# Patient Record
Sex: FEMALE | Race: WHITE | Employment: OTHER | URBAN - METROPOLITAN AREA
[De-identification: names, ages, dates, MRNs, and addresses within clinical notes are randomized per-mention and may not be internally consistent; named-entity substitution may affect disease eponyms.]

---

## 2018-10-26 ENCOUNTER — TRANSFERRED RECORDS (OUTPATIENT)
Dept: HEALTH INFORMATION MANAGEMENT | Facility: CLINIC | Age: 83
End: 2018-10-26

## 2018-11-29 ENCOUNTER — TELEPHONE (OUTPATIENT)
Dept: OPHTHALMOLOGY | Facility: CLINIC | Age: 83
End: 2018-11-29

## 2018-11-29 ENCOUNTER — TRANSFERRED RECORDS (OUTPATIENT)
Dept: HEALTH INFORMATION MANAGEMENT | Facility: CLINIC | Age: 83
End: 2018-11-29

## 2018-11-29 NOTE — TELEPHONE ENCOUNTER
Note to oculo-plastics team to review plan/answer pt questions about appt-- evaluation/possible enucleation referral  Satya Macario RN 3:50 PM 11/29/18

## 2018-11-29 NOTE — TELEPHONE ENCOUNTER
[facility] Call Center    Phone Message    May a detailed message be left on voicemail: yes    Reason for Call: Other: per pt son don- would like a call back to discuss upcoming appt with dr flores. such as visit length- and recovery time thanks     Action Taken: Message routed to:  [facility] ([facility] eye clinic

## 2018-12-03 ENCOUNTER — OFFICE VISIT (OUTPATIENT)
Dept: OPHTHALMOLOGY | Facility: CLINIC | Age: 83
End: 2018-12-03
Payer: COMMERCIAL

## 2018-12-03 ENCOUNTER — DOCUMENTATION ONLY (OUTPATIENT)
Dept: OPHTHALMOLOGY | Facility: CLINIC | Age: 83
End: 2018-12-03

## 2018-12-03 VITALS — HEIGHT: 59 IN | WEIGHT: 137 LBS | BODY MASS INDEX: 27.62 KG/M2

## 2018-12-03 DIAGNOSIS — H57.10 BLIND PAINFUL EYE: Primary | ICD-10-CM

## 2018-12-03 DIAGNOSIS — Z94.7 CORNEA REPLACED BY TRANSPLANT: ICD-10-CM

## 2018-12-03 DIAGNOSIS — H54.40 BLIND PAINFUL EYE: Primary | ICD-10-CM

## 2018-12-03 RX ORDER — BRIMONIDINE TARTRATE 2 MG/ML
SOLUTION/ DROPS OPHTHALMIC
Refills: 6 | COMMUNITY
Start: 2018-09-19

## 2018-12-03 RX ORDER — BIMATOPROST 0.1 MG/ML
SOLUTION/ DROPS OPHTHALMIC
Refills: 6 | COMMUNITY
Start: 2018-10-23

## 2018-12-03 RX ORDER — DORZOLAMIDE HCL 20 MG/ML
SOLUTION/ DROPS OPHTHALMIC
Refills: 6 | COMMUNITY
Start: 2018-10-23

## 2018-12-03 RX ORDER — LIGHT MINERAL OIL, WHITE PETROLATUM 150; 850 MG/G; MG/G
OINTMENT OPHTHALMIC
Refills: 0 | COMMUNITY
Start: 2018-10-26 | End: 2018-12-20

## 2018-12-03 RX ORDER — PROPARACAINE HYDROCHLORIDE 5 MG/ML
SOLUTION/ DROPS OPHTHALMIC
Refills: 0 | COMMUNITY
Start: 2018-11-30 | End: 2018-12-20

## 2018-12-03 RX ORDER — LOTEPREDNOL ETABONATE 5 MG/G
GEL OPHTHALMIC
Refills: 6 | COMMUNITY
Start: 2018-10-23

## 2018-12-03 RX ORDER — OXYBUTYNIN CHLORIDE 5 MG/1
5 TABLET, EXTENDED RELEASE ORAL DAILY
Refills: 3 | COMMUNITY
Start: 2018-12-02

## 2018-12-03 RX ORDER — TIMOLOL MALEATE 5 MG/ML
SOLUTION/ DROPS OPHTHALMIC
Refills: 6 | COMMUNITY
Start: 2018-10-23

## 2018-12-03 RX ORDER — ONDANSETRON 4 MG/1
TABLET, ORALLY DISINTEGRATING ORAL
Refills: 0 | COMMUNITY
Start: 2018-11-26 | End: 2018-12-14

## 2018-12-03 RX ORDER — LISINOPRIL 20 MG/1
TABLET ORAL
Refills: 3 | COMMUNITY
Start: 2018-12-02 | End: 2018-12-14

## 2018-12-03 RX ORDER — LEVETIRACETAM 500 MG/1
500 TABLET ORAL DAILY
Refills: 3 | COMMUNITY
Start: 2018-12-02

## 2018-12-03 RX ORDER — TRAMADOL HYDROCHLORIDE 50 MG/1
TABLET ORAL
Refills: 0 | COMMUNITY
Start: 2018-11-26

## 2018-12-03 ASSESSMENT — VISUAL ACUITY
OD_CC: 20/70
CORRECTION_TYPE: GLASSES
METHOD: SNELLEN - LINEAR
OD_CC+: -1
OS_CC: NLP

## 2018-12-03 ASSESSMENT — SLIT LAMP EXAM - LIDS
COMMENTS: NORMAL
COMMENTS: NORMAL

## 2018-12-03 ASSESSMENT — EXTERNAL EXAM - RIGHT EYE: OD_EXAM: NORMAL

## 2018-12-03 ASSESSMENT — TONOMETRY
OD_IOP_MMHG: 10
OS_IOP_MMHG: 6
IOP_METHOD: ICARE

## 2018-12-03 ASSESSMENT — CONF VISUAL FIELD
OS_SUPERIOR_NASAL_RESTRICTION: 1
OD_NORMAL: 1
OS_INFERIOR_TEMPORAL_RESTRICTION: 1
OS_INFERIOR_NASAL_RESTRICTION: 1
OS_SUPERIOR_TEMPORAL_RESTRICTION: 1

## 2018-12-03 ASSESSMENT — EXTERNAL EXAM - LEFT EYE: OS_EXAM: NORMAL

## 2018-12-03 NOTE — LETTER
12/3/2018         RE:  :  MRN: Amna Herrera  [id number]  [id number]     Dear Dr. Homero Childers,    Thank you for asking me to see your patient, Amna Herrera, for an oculoplastic   consultation.  My assessment and plan are below.  For further details, please see my attached clinic note.          Chief Complaints and History of Present Illnesses   Patient presents with     Consult For     Left eye pain     Patient has had multiple pentrating keratoplasty (PKP) both eyes now no light perception left eye after injury in . Vision has been poor in the left eye since that injury. Patient started having intense pain in the left eye since 2 weeks ago. She was admitted to the hospital for pain control and patient has been using artificial tear ointment which has helped with the eye pain. She was admitted again for pain control when she had intense eye pain last week. Patient is comfortable currently.    Assessment & Plan     Amna Herrera is a 89 year old female with the following diagnoses:   1. Blind painful eye    2. 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      Sincerely,    Cristobal Duque MD  Department of Ophthalmology and Visual Neurosciences  [facility]    CC: Rey Cherry MD  [address] [address]  VIA Facsimile: [phone]     HOMERO CHILDERS  [facility]  [address] [address]  VIA Facsimile: [phone]

## 2018-12-03 NOTE — NURSING NOTE
Chief Complaints and History of Present Illnesses   Patient presents with     Consult For     Left eye pain     HPI    Affected eye(s):  Left   Symptoms:     No blurred vision      Frequency:  Constant       Do you have eye pain now?:  Yes   Location:  OS      Comments:  Pt has had episodes of left eye pain resulting in visits to the ER. Pt states comfort improves with ointment- puralube. Pt states using eye drops for the right eye.     Jase GARCIA 12:32 PM December 3, 2018

## 2018-12-03 NOTE — PROGRESS NOTES
Chief Complaints and History of Present Illnesses   Patient presents with     Consult For     Left eye pain     Patient has had multiple pentrating keratoplasty (PKP) both eyes now no light perception left eye after injury in 2013. Vision has been poor in the left eye since that injury. Patient started having intense pain in the left eye since 2 weeks ago. She was admitted to the hospital for pain control and patient has been using artificial tear ointment which has helped with the eye pain. She was admitted again for pain control when she had intense eye pain last week. Patient is comfortable currently.    Assessment & Plan     Amna Herrera is a 89 year old female with the following diagnoses:   1. 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  I personally obtained the chief complaint(s) and history of present illness.  I confirmed and edited as necessary the review of systems, past medical/surgical history, family history, social history, and examination findings as documented by others; and I examined the patient myself.  I personally reviewed the relevant tests, images, and reports as documented above.  I formulated and edited as necessary the assessment and plan and discussed the findings and management plan with the patient and family. I personally reviewed the ophthalmic test(s) associated with this encounter, agree with the interpretation(s) as documented by the resident/fellow, and have edited the corresponding report(s) as necessary.   -Cristobal Duque MD    Today with Amna Herrera  and her family members, I reviewed the  indications, risks, benefits, and alternatives of the proposed surgical procedure including, but not limited to, failure obtain the desired result  and need for additional surgery, bleeding, infection, loss of vision, loss of the eye, and the remote possibility of permanent damage to any organ system or death with the use of anesthesia.  I provided multiple opportunities for the questions, answered all questions to the best of my ability, and confirmed that my answers and my discussion were understood.   - Cristobal Duque MD 1:26 PM 12/3/2018

## 2018-12-03 NOTE — PROGRESS NOTES
Met with patient to schedule surgery with Dr. Cristobal Duque.    Surgery was scheduled on 12/19 at [facility].    Patient will have H&P at [facility]  Post-Op care appointment was scheduled on 12/28  Patient is aware a / is needed day of surgery.   Patient received surgery packet has my direct contact information for any further questions.

## 2018-12-03 NOTE — PATIENT INSTRUCTIONS
Enucleation and Evisceration     What are enucleation and evisceration?  Enucleation is the surgical removal of the entire eye. Evisceration is the surgical removal of the contents of the eye, leaving the white part of the eye and the eye muscles intact.     Why is enucleation or evisceration necessary?  Removal of an eye may be required following a severe injury, to control pain in a blind eye, to treat some intra-ocular tumors, to alleviate a severe infection inside the eye, or for cosmetic improvement of a disfigured eye.     Why chose one procedure over another?  Enucleation is the procedure of choice if the eye is being removed to treat an intraocular tumor, or to try to reduce the risk of developing a severe autoimmune condition called sympathetic ophthalmia following trauma. In most other situations, either enucleation or evisceration can each achieve the desired objective. Your surgeon will help you to determine which surgery is most appropriate for your condition.     How is the surgery performed?  Both surgeries are usually performed in the operating room under general anesthesia, although they can be completed safely using local anesthesia with sedation. After enucleation or evisceration, most of the lost volume is replaced by an implant placed in the eye socket. The implant is a usually a sphere made of silicone rubber, polyethylene, hydroxyapatite, or alumina, and is covered by the patient's own tissue. In most cases, the eye muscles are attached to the implant following enucleation, in order to preserve eye movement. Several weeks after surgery, an artificial eye, or prosthesis, is made by an . The front surface of the artificial eye is custom painted to match the patient's other eye. The back surface is custom molded to fit exactly in the eye socket for maximum comfort and movement. The prosthesis is easily removable, and may be removed as needed for cleaning. Most patients sleep with the  prosthesis in place. A prosthesis lasts up to 5-10 years in many patients     What is the post-operative care?  Some patients spend the night at the hospital, while others go home the same day as surgery. You may be asked to take medications after surgery such as antibiotics, steroids, or pain-relievers. Patients may wear a patch after surgery for several days to several weeks, until they receive their prosthesis. Continued follow-up is important as the tissues in the socket may atrophy (shrink) with time. This loss of volume may lead to eyelid laxity or socket changes that may affect the fit of the prosthesis. Careful monitoring of the socket and prosthesis by the surgeon and the  will help keep the socket healthy, and will allow for early detection of any changes that may require further treatment.     What are the risks and complications?  Short-term risks for this surgery, as with any surgery, include bleeding and infection. Longer-range complications include discharge and socket irritation or exposure of the implant. As with any medical procedure, there may be other inherent risks that should be discussed with your surgeon.     Who performs the surgery?  Patients are most commonly treated by ophthalmic plastic and reconstructive surgeons who specialize in diseases and problems of the eyelids, tear drain, and orbit (the area around the eye). Dr. Duque has completed an American Society of Ophthalmic Plastic and Reconstructive Surgery (ASOPRS) fellowship. This indicates that he is not only a board certified ophthalmologist, but also has had extensive training in ophthalmic plastic surgery. When you are ready, you will be in experienced hands. Your surgery will be in an outpatient facility or at a hospital depending on your surgical needs.

## 2018-12-03 NOTE — MR AVS SNAPSHOT
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  Enucleation is the surgical removal of the entire eye. Evisceration is the surgical removal of the contents of the eye, leaving the white part of the eye and the eye muscles intact.     Why is enucleation or evisceration necessary?  Removal of an eye may be required following a severe injury, to control pain in a blind eye, to treat some intra-ocular tumors, to alleviate a severe infection inside the eye, or for cosmetic improvement of a disfigured eye.     Why chose one procedure over another?  Enucleation is the procedure of choice if the eye is being removed to treat an intraocular tumor, or to try to reduce the risk of developing a severe autoimmune condition called sympathetic ophthalmia following trauma. In most other situations, either enucleation or evisceration can each achieve the desired objective. Your surgeon will help you to determine which surgery is most appropriate for your condition.     How is the surgery performed?  Both surgeries are usually performed in the operating room under general anesthesia, although they can be completed safely using local anesthesia with sedation. After enucleation or evisceration, most of the lost volume is replaced by an implant placed in the eye socket. The implant is a usually a sphere made of silicone rubber, polyethylene, hydroxyapatite, or alumina, and is covered by the patient's own tissue. In most cases, the eye muscles are attached to the implant following enucleation, in order to preserve eye movement.  Several weeks after surgery, an artificial eye, or prosthesis, is made by an . The front surface of the artificial eye is custom painted to match the patient's other eye. The back surface is custom molded to fit exactly in the eye socket for maximum comfort and movement. The prosthesis is easily removable, and may be removed as needed for cleaning. Most patients sleep with the prosthesis in place. A prosthesis lasts up to 5-10 years in many patients     What is the post-operative care?  Some patients spend the night at the hospital, while others go home the same day as surgery. You may be asked to take medications after surgery such as antibiotics, steroids, or pain-relievers. Patients may wear a patch after surgery for several days to several weeks, until they receive their prosthesis. Continued follow-up is important as the tissues in the socket may atrophy (shrink) with time. This loss of volume may lead to eyelid laxity or socket changes that may affect the fit of the prosthesis. Careful monitoring of the socket and prosthesis by the surgeon and the  will help keep the socket healthy, and will allow for early detection of any changes that may require further treatment.     What are the risks and complications?  Short-term risks for this surgery, as with any surgery, include bleeding and infection. Longer-range complications include discharge and socket irritation or exposure of the implant. As with any medical procedure, there may be other inherent risks that should be discussed with your surgeon.     Who performs the surgery?  Patients are most commonly treated by ophthalmic plastic and reconstructive surgeons who specialize in diseases and problems of the eyelids, tear drain, and orbit (the area around the eye). Dr. Duque has completed an American Society of Ophthalmic Plastic and Reconstructive Surgery (ASOPRS) fellowship. This indicates that he is not only a board certified  "ophthalmologist, but also has had extensive training in ophthalmic plastic surgery. When you are ready, you will be in experienced hands. Your surgery will be in an outpatient facility or at a hospital depending on your surgical needs.              Follow-ups after your visit        Your next 10 appointments already scheduled     Dec 28, 2018 12:00 PM CST   (Arrive by 11:45 AM)   Post-Op with David Lamas MD   [facility] Ophthalmology ([facility])    [address] [place] [address] [phone]              Who to contact     Please call your clinic at [phone] to:    Ask questions about your health    Make or cancel appointments    Discuss your medicines    Learn about your test results    Speak to your doctor            Additional Information About Your Visit        MyChart Information     [facility] is an electronic gateway that provides easy, online access to your medical records. With [facility], you can request a clinic appointment, read your test results, renew a prescription or communicate with your care team.     To sign up for [employer] visit the website at [email or web address]   You will be asked to enter the access code listed below, as well as some personal information. Please follow the directions to create your username and password.     Your access code is: [email or web address]  Expires: 2019  6:30 AM     Your access code will  in 90 days. If you need help or a new code, please contact your [facility] or call [phone] for assistance.        Care EveryWhere ID     This is your Care EveryWhere ID. This could be used by other organizations to access your [place] medical records  [id number]        Your Vitals Were     Height BMI (Body Mass Index)                1.499 m (4' 11\") 27.67 kg/m2           Blood Pressure from Last 3 Encounters:   11 159/81    Weight from Last 3 Encounters:   18 " 62.1 kg (137 lb)   08/31/11 73 kg (160 lb 15 oz)              We Performed the Following     Liudmila-Operative Worksheet (Plastics)       Information about OPIOIDS     PRESCRIPTION OPIOIDS: WHAT YOU NEED TO KNOW   We gave you an opioid (narcotic) pain medicine. It is important to manage your pain, but opioids are not always the best choice. You should first try all the other options your care team gave you. Take this medicine for as short a time (and as few doses) as possible.    Some activities can increase your pain, such as bandage changes or therapy sessions. It may help to take your pain medicine 30 to 60 minutes before these activities. Reduce your stress by getting enough sleep, working on hobbies you enjoy and practicing relaxation or meditation. Talk to your care team about ways to manage your pain beyond prescription opioids.    These medicines have risks:    DO NOT drive when on new or higher doses of pain medicine. These medicines can affect your alertness and reaction times, and you could be arrested for driving under the influence (DUI). If you need to use opioids long-term, talk to your care team about driving.    DO NOT operate heavy machinery    DO NOT do any other dangerous activities while taking these medicines.    DO NOT drink any alcohol while taking these medicines.     If the opioid prescribed includes acetaminophen, DO NOT take with any other medicines that contain acetaminophen. Read all labels carefully. Look for the word  acetaminophen  or  Tylenol.  Ask your pharmacist if you have questions or are unsure.    You can get addicted to pain medicines, especially if you have a history of addiction (chemical, alcohol or substance dependence). Talk to your care team about ways to reduce this risk.    All opioids tend to cause constipation. Drink plenty of water and eat foods that have a lot of fiber, such as fruits, vegetables, prune juice, apple juice and high-fiber cereal. Take a laxative  (Miralax, milk of magnesia, Colace, Senna) if you don t move your bowels at least every other day. Other side effects include upset stomach, sleepiness, dizziness, throwing up, tolerance (needing more of the medicine to have the same effect), physical dependence and slowed breathing.    Store your pills in a secure place, locked if possible. We will not replace any lost or stolen medicine. If you don t finish your medicine, please throw away (dispose) as directed by your pharmacist. The [place] Pollution Control Agency has more information about safe disposal: [email or web address].[email or web address]-medications         Primary Care Provider Office Phone # Fax #    Rey Cherry [phone]       [facility] [address] [facility] [address]        Equal Access to Services     [facility] Center[facility]HEL[facility]E : Murray klein Somalvin, waaxda luqadaha, qaybta kaalmada mary kay, anurag lobo . So [facility] [phone].    ATENCIÓN: Si habla español, tiene a gutierrez disposición servicios gratuitos de asistencia lingüística. Kae al [phone].    We comply with applicable federal civil rights laws and [place] laws. We do not discriminate on the basis of race, color, national origin, age, disability, sex, sexual orientation, or gender identity.            Thank you!     Thank you for choosing [facility]  for your care. Our goal is always to provide you with excellent care. Hearing back from our patients is one way we can continue to improve our services. Please take a few minutes to complete the written survey that you may receive in the mail after your visit with us. Thank you!             Your Updated Medication List - Protect others around you: Learn how to safely use, store and throw away your medicines at www.disposemymeds.org.          This list is accurate as of 12/3/18  1:44 PM.  Always use your most recent med list.                    Brand Name Dispense Instructions for use Diagnosis    brimonidine 0.2 % ophthalmic solution    ALPHAGAN     INSTILL ONE DROP IN RIGHT EYE THREE TIMES DAILY        cholecalciferol 400 unit (10 mcg) Tabs tablet    VITAMIN D3     Take 400 Units by mouth daily.        dexamethasone 0.1 % ophthalmic suspension    DECADRON     Place 1 drop into both eyes 3 times daily.        dorzolamide 2 % ophthalmic solution    TRUSOPT     INSTILL 1 DROP INTO RIGHT EYE 2 TIMES DAILY.        levETIRAcetam 500 MG tablet    KEPPRA     Take 500 mg by mouth daily        * lisinopril 20 MG tablet    PRINIVIL/ZESTRIL     TAKE 2 TABLETS BY MOUTH EVERY DAY        * lisinopril 40 MG tablet    PRINIVIL/ZESTRIL     Take 40 mg by mouth daily.        * LOTEMAX 0.5 % Gel   Generic drug:  Loteprednol Etabonate      INSTILL ONE DROP IN RIGHT EYE ONCE DAILY.        * loteprednol 0.2 % Susp ophthalmic susp    LOTEMAX/ALREX     1 drop daily. Once daily in left eye        LUMIGAN 0.01 % Soln   Generic drug:  bimatoprost      INSTILL 1 DROP INTO RIGHT EYE ONCE DAILY IN THE EVENING.        MILK OF MAGNESIA 400 MG/5ML suspension   Generic drug:  magnesium hydroxide      Take  by mouth daily as needed.        multivitamin per tablet      Take 1 tablet by mouth daily.        ondansetron 4 MG ODT tab    ZOFRAN-ODT     TAKE 1 REMOVE 1 TABLET FROM PACKAGING WITH DRY HANDS PLACE ON TONGUE TO DISSOLVE FOUR TIMES DAILY AS NEEDED        oxybutynin ER 5 MG 24 hr tablet    DITROPAN-XL     Take 5 mg by mouth daily        petrolatum ophthalmic ointment      APPLY 1 4 INCH STRIP TO LEFT LOWER EYE LID TWICE A DAY        proparacaine 0.5 % ophthalmic solution    ALCAINE     INSTILL ONE DROP IN LEFT EYE AS NEEDED FOR SEVERE EYE PAIN NOT TO EXCEED 3 DOSES PER DAY        SHINGRIX injection   Generic drug:  zoster vaccine recombinant adjuvanted      INJECT 0.5ML INTRAMUSCULLAR LY.        timolol maleate 0.5 % ophthalmic solution    TIMOPTIC     INSTILL 1 DROP INTO RIGHT  EYE 2 TIMES DAILY.        traMADol 50 MG tablet    ULTRAM     TAKE ONE TABLET BY MOUTH EVERY 8 HOURS AS NEEDED FOR PAIN        XALATAN 0.005 % ophthalmic solution   Generic drug:  latanoprost      Place 1 drop Into the left eye At Bedtime.        * Notice:  This list has 4 medication(s) that are the same as other medications prescribed for you. Read the directions carefully, and ask your doctor or other care provider to review them with you.

## 2018-12-18 ENCOUNTER — ANESTHESIA EVENT (OUTPATIENT)
Dept: SURGERY | Facility: AMBULATORY SURGERY CENTER | Age: 83
End: 2018-12-18

## 2018-12-19 ENCOUNTER — HOSPITAL ENCOUNTER (OUTPATIENT)
Facility: AMBULATORY SURGERY CENTER | Age: 83
End: 2018-12-19
Attending: OPHTHALMOLOGY
Payer: COMMERCIAL

## 2018-12-19 ENCOUNTER — TELEPHONE (OUTPATIENT)
Dept: OPHTHALMOLOGY | Facility: CLINIC | Age: 83
End: 2018-12-19

## 2018-12-19 ENCOUNTER — ANESTHESIA (OUTPATIENT)
Dept: SURGERY | Facility: AMBULATORY SURGERY CENTER | Age: 83
End: 2018-12-19

## 2018-12-19 VITALS
WEIGHT: 137 LBS | DIASTOLIC BLOOD PRESSURE: 87 MMHG | RESPIRATION RATE: 16 BRPM | BODY MASS INDEX: 27.62 KG/M2 | OXYGEN SATURATION: 98 % | HEART RATE: 77 BPM | SYSTOLIC BLOOD PRESSURE: 146 MMHG | TEMPERATURE: 97.2 F | HEIGHT: 59 IN

## 2018-12-19 DIAGNOSIS — R11.0 NAUSEA: Primary | ICD-10-CM

## 2018-12-19 DIAGNOSIS — Z98.890 POSTOPERATIVE EYE STATE: Primary | ICD-10-CM

## 2018-12-19 DEVICE — EYE IMP SPHERE SILICONE 18MM S6.1018U: Type: IMPLANTABLE DEVICE | Site: EYE | Status: FUNCTIONAL

## 2018-12-19 RX ORDER — PROPOFOL 10 MG/ML
INJECTION, EMULSION INTRAVENOUS PRN
Status: DISCONTINUED | OUTPATIENT
Start: 2018-12-19 | End: 2018-12-19

## 2018-12-19 RX ORDER — LIDOCAINE HYDROCHLORIDE AND EPINEPHRINE 10; 10 MG/ML; UG/ML
INJECTION, SOLUTION INFILTRATION; PERINEURAL PRN
Status: DISCONTINUED | OUTPATIENT
Start: 2018-12-19 | End: 2018-12-19 | Stop reason: HOSPADM

## 2018-12-19 RX ORDER — DEXAMETHASONE SODIUM PHOSPHATE 4 MG/ML
INJECTION, SOLUTION INTRA-ARTICULAR; INTRALESIONAL; INTRAMUSCULAR; INTRAVENOUS; SOFT TISSUE PRN
Status: DISCONTINUED | OUTPATIENT
Start: 2018-12-19 | End: 2018-12-19

## 2018-12-19 RX ORDER — ASPIRIN 81 MG/1
81 TABLET ORAL DAILY
COMMUNITY

## 2018-12-19 RX ORDER — HYDROCODONE BITARTRATE AND ACETAMINOPHEN 5; 325 MG/1; MG/1
1-2 TABLET ORAL EVERY 4 HOURS PRN
Qty: 15 TABLET | Refills: 0 | Status: SHIPPED | OUTPATIENT
Start: 2018-12-19 | End: 2019-02-11

## 2018-12-19 RX ORDER — OXYCODONE HYDROCHLORIDE 5 MG/1
5 TABLET ORAL EVERY 4 HOURS PRN
Status: DISCONTINUED | OUTPATIENT
Start: 2018-12-19 | End: 2018-12-20 | Stop reason: HOSPADM

## 2018-12-19 RX ORDER — CEPHALEXIN 500 MG/1
500 CAPSULE ORAL 2 TIMES DAILY
Qty: 20 CAPSULE | Refills: 0 | Status: SHIPPED | OUTPATIENT
Start: 2018-12-19 | End: 2019-02-11

## 2018-12-19 RX ORDER — NALOXONE HYDROCHLORIDE 0.4 MG/ML
.1-.4 INJECTION, SOLUTION INTRAMUSCULAR; INTRAVENOUS; SUBCUTANEOUS
Status: DISCONTINUED | OUTPATIENT
Start: 2018-12-19 | End: 2018-12-20 | Stop reason: HOSPADM

## 2018-12-19 RX ORDER — SODIUM CHLORIDE, SODIUM LACTATE, POTASSIUM CHLORIDE, CALCIUM CHLORIDE 600; 310; 30; 20 MG/100ML; MG/100ML; MG/100ML; MG/100ML
INJECTION, SOLUTION INTRAVENOUS CONTINUOUS
Status: DISCONTINUED | OUTPATIENT
Start: 2018-12-19 | End: 2018-12-19 | Stop reason: HOSPADM

## 2018-12-19 RX ORDER — ONDANSETRON 2 MG/ML
4 INJECTION INTRAMUSCULAR; INTRAVENOUS EVERY 30 MIN PRN
Status: DISCONTINUED | OUTPATIENT
Start: 2018-12-19 | End: 2018-12-20 | Stop reason: HOSPADM

## 2018-12-19 RX ORDER — FENTANYL CITRATE 50 UG/ML
25-50 INJECTION, SOLUTION INTRAMUSCULAR; INTRAVENOUS
Status: DISCONTINUED | OUTPATIENT
Start: 2018-12-19 | End: 2018-12-19 | Stop reason: HOSPADM

## 2018-12-19 RX ORDER — GLYCOPYRROLATE 0.2 MG/ML
INJECTION, SOLUTION INTRAMUSCULAR; INTRAVENOUS PRN
Status: DISCONTINUED | OUTPATIENT
Start: 2018-12-19 | End: 2018-12-19

## 2018-12-19 RX ORDER — ONDANSETRON 4 MG/1
4 TABLET, ORALLY DISINTEGRATING ORAL EVERY 30 MIN PRN
Status: DISCONTINUED | OUTPATIENT
Start: 2018-12-19 | End: 2018-12-20 | Stop reason: HOSPADM

## 2018-12-19 RX ORDER — EPHEDRINE SULFATE 50 MG/ML
INJECTION, SOLUTION INTRAMUSCULAR; INTRAVENOUS; SUBCUTANEOUS PRN
Status: DISCONTINUED | OUTPATIENT
Start: 2018-12-19 | End: 2018-12-19

## 2018-12-19 RX ORDER — ACETAMINOPHEN 325 MG/1
975 TABLET ORAL ONCE
Status: COMPLETED | OUTPATIENT
Start: 2018-12-19 | End: 2018-12-19

## 2018-12-19 RX ORDER — HYDROMORPHONE HYDROCHLORIDE 1 MG/ML
.3-.5 INJECTION, SOLUTION INTRAMUSCULAR; INTRAVENOUS; SUBCUTANEOUS EVERY 10 MIN PRN
Status: DISCONTINUED | OUTPATIENT
Start: 2018-12-19 | End: 2018-12-20 | Stop reason: HOSPADM

## 2018-12-19 RX ORDER — SODIUM CHLORIDE, SODIUM LACTATE, POTASSIUM CHLORIDE, CALCIUM CHLORIDE 600; 310; 30; 20 MG/100ML; MG/100ML; MG/100ML; MG/100ML
INJECTION, SOLUTION INTRAVENOUS CONTINUOUS
Status: DISCONTINUED | OUTPATIENT
Start: 2018-12-19 | End: 2018-12-20 | Stop reason: HOSPADM

## 2018-12-19 RX ORDER — ONDANSETRON 2 MG/ML
INJECTION INTRAMUSCULAR; INTRAVENOUS PRN
Status: DISCONTINUED | OUTPATIENT
Start: 2018-12-19 | End: 2018-12-19

## 2018-12-19 RX ORDER — PROPOFOL 10 MG/ML
INJECTION, EMULSION INTRAVENOUS CONTINUOUS PRN
Status: DISCONTINUED | OUTPATIENT
Start: 2018-12-19 | End: 2018-12-19

## 2018-12-19 RX ORDER — LIDOCAINE 40 MG/G
CREAM TOPICAL
Status: DISCONTINUED | OUTPATIENT
Start: 2018-12-19 | End: 2018-12-19 | Stop reason: HOSPADM

## 2018-12-19 RX ORDER — ERYTHROMYCIN 5 MG/G
OINTMENT OPHTHALMIC
Qty: 3.5 G | Refills: 0 | Status: SHIPPED | OUTPATIENT
Start: 2018-12-19 | End: 2018-12-20

## 2018-12-19 RX ORDER — MEPERIDINE HYDROCHLORIDE 25 MG/ML
12.5 INJECTION INTRAMUSCULAR; INTRAVENOUS; SUBCUTANEOUS
Status: DISCONTINUED | OUTPATIENT
Start: 2018-12-19 | End: 2018-12-20 | Stop reason: HOSPADM

## 2018-12-19 RX ORDER — ONDANSETRON 4 MG/1
4 TABLET, FILM COATED ORAL EVERY 8 HOURS PRN
Qty: 9 TABLET | Refills: 0 | Status: SHIPPED | OUTPATIENT
Start: 2018-12-19 | End: 2019-02-11

## 2018-12-19 RX ORDER — LIDOCAINE HYDROCHLORIDE 20 MG/ML
INJECTION, SOLUTION INFILTRATION; PERINEURAL PRN
Status: DISCONTINUED | OUTPATIENT
Start: 2018-12-19 | End: 2018-12-19

## 2018-12-19 RX ADMIN — PROPOFOL 200 MG: 10 INJECTION, EMULSION INTRAVENOUS at 08:39

## 2018-12-19 RX ADMIN — Medication 50 MCG: at 09:14

## 2018-12-19 RX ADMIN — ACETAMINOPHEN 975 MG: 325 TABLET ORAL at 08:24

## 2018-12-19 RX ADMIN — EPHEDRINE SULFATE 10 MG: 50 INJECTION, SOLUTION INTRAMUSCULAR; INTRAVENOUS; SUBCUTANEOUS at 08:50

## 2018-12-19 RX ADMIN — PROPOFOL 150 MCG/KG/MIN: 10 INJECTION, EMULSION INTRAVENOUS at 08:39

## 2018-12-19 RX ADMIN — ONDANSETRON 4 MG: 2 INJECTION INTRAMUSCULAR; INTRAVENOUS at 08:39

## 2018-12-19 RX ADMIN — DEXAMETHASONE SODIUM PHOSPHATE 4 MG: 4 INJECTION, SOLUTION INTRA-ARTICULAR; INTRALESIONAL; INTRAMUSCULAR; INTRAVENOUS; SOFT TISSUE at 08:39

## 2018-12-19 RX ADMIN — LIDOCAINE HYDROCHLORIDE 60 MG: 20 INJECTION, SOLUTION INFILTRATION; PERINEURAL at 08:39

## 2018-12-19 RX ADMIN — SODIUM CHLORIDE, SODIUM LACTATE, POTASSIUM CHLORIDE, CALCIUM CHLORIDE: 600; 310; 30; 20 INJECTION, SOLUTION INTRAVENOUS at 08:26

## 2018-12-19 RX ADMIN — GLYCOPYRROLATE 0.2 MG: 0.2 INJECTION, SOLUTION INTRAMUSCULAR; INTRAVENOUS at 08:54

## 2018-12-19 ASSESSMENT — MIFFLIN-ST. JEOR: SCORE: 947.06

## 2018-12-19 NOTE — ANESTHESIA CARE TRANSFER NOTE
Patient: Amna Herrera    Procedure(s):  LEFT EYE EVISCERATION IMPLANT    Diagnosis: Blind Painful Eye  Diagnosis Additional Information: No value filed.    Anesthesia Type:   No value filed.     Note:  Airway :Room Air  Patient transferred to:PACU  Comments: Report to RN    99/55, 12, 67, 98%Handoff Report: Identifed the Patient, Identified the Reponsible Provider, Reviewed the pertinent medical history, Discussed the surgical course, Reviewed Intra-OP anesthesia mangement and issues during anesthesia, Set expectations for post-procedure period and Allowed opportunity for questions and acknowledgement of understanding      Vitals: (Last set prior to Anesthesia Care Transfer)    CRNA VITALS  12/19/2018 0858 - 12/19/2018 0931      12/19/2018             Pulse:  75    SpO2:  99 %    Resp Rate (observed):  16                Electronically Signed By: DINO Cali CRNA  December 19, 2018  9:31 AM

## 2018-12-19 NOTE — DISCHARGE INSTRUCTIONS
[facility]  Home Care Following Anesthesia  For 24 hours after surgery:  1. Get plenty of rest.  A responsible adult must stay with you for at least 24 hours after you leave the surgery center.  2. Do not drive or use heavy equipment.  If you have weakness or tingling, don't drive or use heavy equipment until this feeling goes away.   3. Do not drink alcohol.   4. Avoid strenuous or risky activities.  Ask for help when climbing stairs.  5. You may feel lightheaded.  IF so, sit for a few minutes before standing.  Have someone help you get up.   6. If you have nausea (feel sick to your stomach): Drink only clear liquids such as apple juice, ginger ale, broth or 7-Up.  Rest may also help.  Be sure to drink enough fluids.  Move to a regular diet as you feel able.   7. You may have a slight fever.  Call the doctor if your fever is over 100 F (37.7 C) (taken under the tongue) or lasts longer than 24 hours.  8. You may have a dry mouth, a sore throat, muscle aches or trouble sleeping. These should go away after 24 hours.  9. Do not make important or legal decisions.        Tips for taking pain medications  To get the best pain relief possible, remember these points:    Take pain medications as directed, before pain becomes severe.    Pain medication can upset your stomach: taking it with food may help.    Constipation is a common side effect of pain medication. Drink plenty of  fluids.    Eat foods high in fiber. Take a stool softener if recommended by your doctor or pharmacist.    Do not drink alcohol, drive or operate machinery while taking pain medications.    Ask about other ways to control pain, such as with heat, ice or relaxation.    Tylenol/Acetaminophen Consumption  To help encourage the safe use of acetaminophen, the makers of TYLENOL  have lowered the maximum daily dose for single-ingredient Extra Strength TYLENOL  (acetaminophen) products sold in the [place] from 8 pills per  day (4,000 mg) to 6 pills per day (3,000 mg). The dosing interval has also changed from 2 pills every 4-6 hours to 2 pills every 6 hours.    If you feel your pain relief is insufficient, you may take Tylenol/Acetaminophen in addition to your narcotic pain medication.     Be careful not to exceed 3,000 mg of Tylenol/Acetaminophen in a 24 hour period from all sources.    If you are taking extra strength Tylenol/acetaminophen (500 mg), the maximum dose is 6 tablets in 24 hours.    If you are taking regular strength acetaminophen (325 mg), the maximum dose is 9 tablets in 24 hours.    Tylenol 975mg given at 8:24am; next dose available after 2:30pm. Then follow package instructions.     Call a doctor for any of the followin. Signs of infection (fever, growing tenderness at the surgery site, a large amount of drainage or bleeding, severe pain, foul-smelling drainage, redness, swelling).  2. It has been over 8 to 10 hours since surgery and you are still not able to urinate (pass water).  3. Headache for over 24 hours.    Your doctor is:  Dr. Cristobal Duque, Ophthalmology: [phone]                  Or dial [phone] and ask for the resident on call for:  Ophthalmology  For emergency care, call the:  [facility]:  [phone] (TTY for hearing impaired: [phone])    Post-operative Instructions    Ophthalmic Plastic and Reconstructive Surgery  Cristobal Duque M.D.  David Lamas M.D.    All instructions apply to the operated eye(s) or eyelid(s)      What to expect after surgery:    There will be some swelling, bruising, and likely a black eye (even into the lower eyelids and cheeks). Also expect crusting and discharge from the eye and/or incisions.     A small amount of surface bleeding is normal for the first 48 hours after surgery.    You may notice some bloody tears for the first few days after surgery. This is normal.    Your eye(s) and eyelid(s) may be painful and tender. This is  normal after surgery. Use the pain medication as prescribed. If your pain does not improve despite the medication, contact the office.    Wound care and personal care:    If a patch or bandage has been placed, please leave this in place until seen in clinic. Prevent the bandage from getting wet.     Apply ice compresses 15 minutes on 15 minutes off while awake for the first 2 days after surgery, then switch to warm compresses 4 times a day until seen by your physician.     For warm packs you can place a cup of dry uncooked rice in a clean cotton sock. Place sock in microwave 30 seconds to one minute. Next place the warm sock into a plastic bag and wrap the bag with clean warm wet washcloth and place over operated eye.      You may shower or wash your hair the day after surgery. Do not bathe or go swimming for 1 week to prevent contamination of your wounds.    Do not apply make-up to the eyes or eyelids for 2 weeks after surgery.      Activity restrictions and driving:    Avoid heavy lifting, bending, exercise or strenuous activity for 1 week after surgery.    You may resume other activities and return to work as tolerated.    You may not resume driving until have you stopped using narcotic pain medications(such as Norco, Percocet, Tylenol #3).    Medications:    Restart all your regular home medications and eye drops today. If you take Plavix or Aspirin on a regular basis, wait for 3 days after your surgery before restarting these in order to decrease the risk of bleeding complications.    Avoid aspirin and aspirin-like medications (Motrin, Aleve, Ibuprofen, [place] etc) for 5 days to reduce the risk of bleeding. You may take Tylenol (acetaminophen) for pain.    In addition to your home medications, take the following post-operative medications as prescribed by your physician:    Apply antibiotic ointment (erythromycin) to eye socket three times a day after eye patch removed in the office    Antibiotic (Keflex)  1 pill twice a day for 10 days    Take 1 to 2 pain pills (norco or tylenol 3 as prescribed) as needed for pain up to every 4 hours.    The pain pills may make you drowsy. You must not drive a car, operate heavy machinery or drink alcohol while taking them.    The pain pills may cause constipation and nausea. Take them with some food to prevent a stomach upset. If you continue to experience nausea, call your physician.      WARNING: All the prescription pain medications listed above contain Tylenol (acetaminophen). You must not take more than 4,000 mg of acetaminophen per 24-hour period. This is equivalent to 6 tablets of Darvocet, 8 tablets of Vicodin, or 12 tablets of Norco, Percocet or Tylenol #3. If you take other over-the-counter medications containing acetaminophen, you must take the amount of acetaminophen into account and reduce the number of prescribed pain pills accordingly.    Contact information and follow-up:    Return to the Eye Clinic for a follow-up appointment with your physician as  scheduled. If no appointment has been scheduled, call [phone] for an  appointment with Dr. Duque within 1 to 2 weeks from your date of surgery.      For severe pain, bleeding, or loss of vision, call the Eye Clinic at [phone].    After hours or on weekends and holidays, call [phone] and ask to speak with the ophthalmologist on call.

## 2018-12-19 NOTE — OP NOTE
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   SURGEON: Cristobal Duque MD.   ASSISTANTS: Abel Benedict MD and Casey Vázquez MD  ANESTHESIA: General via endotracheal tube and a retrobulbar block consisting of a 50:50 mixture of 2% lidocaine.   COMPLICATIONS: None.   ESTIMATED BLOOD LOSS: Less than 10 mL.   SPECIMENS: Intraocular contents and cornea from left eye sent to pathology.   IMPLANTS: 18 silicone sphere.   HISTORY OF PRESENT ILLNESS: Amna Herrera  presented with a  history of severe pain and blindness in the left eye. After the risks, benefits and alternatives to the proposed procedure were explained to the patient, informed consent was obtained.   DESCRIPTION OF PROCEDURE: Amna Herrera  was brought to the operating room and placed supine on the operating table. General anesthesia was induced. The left was identified as the correct eye for surgery. Retrobulbar block was placed on the left. The area was  prepped and draped in the typical sterile ophthalmic fashion. A 360-degree conjunctival peritomy was performed. The limbal incision was made with the keratome and the cornea was excised with Ash scissors, taking a rim of the sclera. Using the evisceration spoon, the intraocular contents were removed. Hemostasis was obtained with bipolar cautery. The scleral shell was rinsed with absolute alcohol. Hemostasis was again obtained with bipolar cautery. Relaxing incisions were made at the 2 and 7 o'clock positions on the sclera. Posterior  relaxing incisions were made in thesclera using the keratome to create a larger scleral volume to allow the 18 mm implant to be inserted and the sclera easily closed without tension over it. The intraocular sizing spheres were used to determine the correct implant size. An 18  mm implant was chosen. The sclera was then closed with interrupted 5-0 nylon sutures passed in mattress fashion.  Conjunctiva was closed with a 6-0 Vicryl suture. Erythromycin ophthalmic ointment and a conformer were placed. The pressure patch was placed. The patient tolerated the procedure well, was awoken from general anesthesia and taken to recovery room in stable condition.   ANNAMARIE HERZOG MD

## 2018-12-19 NOTE — TELEPHONE ENCOUNTER
Left eye evisceration today     Pt having pain under eye and toward sinus    Very nauseated today    No food and only a little water    Pt using tylenol 1000 mg twice today     Pt will be on hydrocodone this afternoon    Reviewed with dr. faith and dr. Benedict    Will order zofran 4 mg q 8 PRN nausea    If nausea continues and unable to eat/drink will need to f/u with urgent care for possible electrolyte imbalance/dehydration    Pt verbally demonstrated understanding  Satya Macario RN 4:02 PM 12/19/18    Rx did not go thru e-scribing-- called into pharmacy   Satya Macario RN 4:08 PM 12/19/18

## 2018-12-19 NOTE — ANESTHESIA PREPROCEDURE EVALUATION
Anesthesia Pre-Procedure Evaluation    Patient: Amna Herrera   MRN:     [id number] Gender:   female   Age:    89 year old :      1928        Preoperative Diagnosis: Blind Painful Eye   Procedure(s):  LEFT EYE EVISCERATION IMPLANT     Past Medical History:   Diagnosis Date     Hypertension       Past Surgical History:   Procedure Laterality Date     BIOPSY BREAST       C ANESTH,CORNEAL TRANSPLANT       CATARACT IOL, RT/LT       DSAEK Right 2011     KERATOPLASTY DESCEMETS STRIPPING ENDOTHELIAL (DSEK)  2011    Procedure:KERATOPLASTY LAMELLAR DESCEMET'S STRIPPING; Descemet's Stripping Automated Endothelial Keratoplasty Right Eye; Surgeon:SHENG IRAHETA; Location:[facility]     LAPAROSCOPY  Diagnostic Laparoscopy for abnormal ovary     removal of melanoma L arm       tumor removal wrist            Anesthesia Evaluation     . Pt has had prior anesthetic. Type: General    No history of anesthetic complications          ROS/MED HX    ENT/Pulmonary:  - neg pulmonary ROS     Neurologic:  - neg neurologic ROS     Cardiovascular:     (+) hypertension----. : . . . :. .       METS/Exercise Tolerance:  4 - Raking leaves, gardening   Hematologic:  - neg hematologic  ROS       Musculoskeletal:  - neg musculoskeletal ROS       GI/Hepatic:  - neg GI/hepatic ROS       Renal/Genitourinary:  - ROS Renal section negative       Endo:  - neg endo ROS       Psychiatric:  - neg psychiatric ROS       Infectious Disease:  - neg infectious disease ROS       Malignancy:      - no malignancy   Other:                         PHYSICAL EXAM:   Mental Status/Neuro: A/A/O   Airway: Facies: Feasible  Mallampati: I  Mouth/Opening: Full  TM distance: > 6 cm  Neck ROM: Full   Respiratory: Auscultation: CTAB     Resp. Rate: Normal     Resp. Effort: Normal      CV: Rhythm: Regular  Rate: Age appropriate  Heart: Normal Sounds   Comments:      Dental: Normal                  Lab Results   Component Value Date    POTASSIUM 4.5  "08/31/2011    CR 0.88 08/31/2011       Preop Vitals  BP Readings from Last 3 Encounters:   12/19/18 (!) 156/93   09/01/11 159/81    Pulse Readings from Last 3 Encounters:   12/19/18 66   09/01/11 70      Resp Readings from Last 3 Encounters:   12/19/18 18   09/01/11 12    SpO2 Readings from Last 3 Encounters:   12/19/18 98%   09/01/11 99%      Temp Readings from Last 1 Encounters:   12/19/18 36.4  C (97.5  F) (Oral)    Ht Readings from Last 1 Encounters:   12/19/18 1.499 m (4' 11\")      Wt Readings from Last 1 Encounters:   12/19/18 62.1 kg (137 lb)    Estimated body mass index is 27.67 kg/m  as calculated from the following:    Height as of this encounter: 1.499 m (4' 11\").    Weight as of this encounter: 62.1 kg (137 lb).     LDA:            Assessment:   ASA SCORE: 1       Documentation: H&P complete; Preop Testing complete; Consents complete   Proceeding: Proceed without further delay  Tobacco Use:  Active user of Tobacco     Plan:   Anes. Type:  General   Pre-Induction: Midazolam IV; Acetaminophen PO   Induction:  IV (Standard)   Airway: LMA   Access/Monitoring: PIV   Maintenance: IV; Propofol   Emergence: Procedure Site   Logistics: Same Day Surgery     Postop Pain/Sedation Strategy:  Standard-Options: Opioids PRN     PONV Management:  Adult Risk Factors: Female, Postop Opioids  Prevention: Propofol Infusion; Ondansetron; Dexamethasone     CONSENT: Direct conversation   Plan and risks discussed with: Patient                            Kushal Plasencia MD, MD  "

## 2018-12-19 NOTE — ANESTHESIA POSTPROCEDURE EVALUATION
Anesthesia POST Procedure Evaluation    Patient: Amna Herrera   MRN:     [id number] Gender:   female   Age:    89 year old :      1928        Preoperative Diagnosis: Blind Painful Eye   Procedure(s):  LEFT EYE EVISCERATION IMPLANT   Postop Comments: No value filed.       Anesthesia Type:  General    Reportable Event: NO     PAIN: Uncomplicated   Sign Out status: Comfortable, Well controlled pain     PONV: No PONV   Sign Out status:  No Nausea or Vomiting     Neuro/Psych: Uneventful perioperative course   Sign Out Status: Preoperative baseline; Age appropriate mentation     Airway/Resp.: Uneventful perioperative course   Sign Out Status: Non labored breathing, age appropriate RR; Resp. Status within EXPECTED Parameters     CV: Uneventful perioperative course   Sign Out status: Appropriate BP and perfusion indices; Appropriate HR/Rhythm     Disposition:   Sign Out in:  PACU  Disposition:  Phase II; Home  Recovery Course: Uneventful  Follow-Up: Not required           Last Anesthesia Record Vitals:  CRNA VITALS  2018 0858 - 2018 0958      2018             Pulse:  75    SpO2:  99 %    Resp Rate (observed):  16          Last PACU/Preop Vitals:  Vitals:    18 0930 18 0945 18 1015   BP: 99/55 127/73 146/87   Pulse: 77     Resp: 16 16 16   Temp: 35.7  C (96.3  F) 36.2  C (97.1  F) 36.2  C (97.2  F)   SpO2: 96% 96% 98%         Electronically Signed By: Kushal Plasencia MD, MD, 2018, 11:33 AM

## 2018-12-19 NOTE — BRIEF OP NOTE
[facility] Brief Operative Note    Pre-operative diagnosis: Blind Painful Eye   Post-operative diagnosis Same   Procedure: Procedure(s):  LEFT EYE EVISCERATION IMPLANT   Surgeon: Cristobal Duque M.D.    Assistants(s): Casey Vázquez M.D.; Abel Benedict M.D.   Estimated blood loss: Less than 10 mL   Specimens: Sent to pathology   Findings: As expected

## 2018-12-20 ENCOUNTER — TELEPHONE (OUTPATIENT)
Dept: OPHTHALMOLOGY | Facility: CLINIC | Age: 83
End: 2018-12-20

## 2018-12-20 ENCOUNTER — HOSPITAL ENCOUNTER (OUTPATIENT)
Facility: CLINIC | Age: 83
Setting detail: OBSERVATION
Discharge: HOME | End: 2018-12-22
Attending: FAMILY MEDICINE | Admitting: FAMILY MEDICINE
Payer: MEDICARE

## 2018-12-20 DIAGNOSIS — R11.2 NAUSEA & VOMITING: Primary | ICD-10-CM

## 2018-12-20 DIAGNOSIS — S05.72XD: ICD-10-CM

## 2018-12-20 DIAGNOSIS — I10 ESSENTIAL HYPERTENSION: ICD-10-CM

## 2018-12-20 DIAGNOSIS — E87.1 HYPOSMOLALITY SYNDROME: ICD-10-CM

## 2018-12-20 LAB
ALBUMIN SERPL-MCNC: 4.1 G/DL (ref 3.4–5)
ALBUMIN UR-MCNC: 30 MG/DL
ALP SERPL-CCNC: 61 U/L (ref 40–150)
ALT SERPL W P-5'-P-CCNC: 19 U/L (ref 0–50)
ANION GAP SERPL CALCULATED.3IONS-SCNC: 9 MMOL/L (ref 3–14)
APPEARANCE UR: CLEAR
AST SERPL W P-5'-P-CCNC: 44 U/L (ref 0–45)
BASOPHILS # BLD AUTO: 0 10E9/L (ref 0–0.2)
BASOPHILS NFR BLD AUTO: 0 %
BILIRUB SERPL-MCNC: 1 MG/DL (ref 0.2–1.3)
BILIRUB UR QL STRIP: NEGATIVE
BUN SERPL-MCNC: 20 MG/DL (ref 7–30)
CALCIUM SERPL-MCNC: 8.8 MG/DL (ref 8.5–10.1)
CHLORIDE SERPL-SCNC: 91 MMOL/L (ref 94–109)
CO2 SERPL-SCNC: 26 MMOL/L (ref 20–32)
COLOR UR AUTO: ABNORMAL
CREAT SERPL-MCNC: 0.93 MG/DL (ref 0.52–1.04)
DIFFERENTIAL METHOD BLD: ABNORMAL
EOSINOPHIL # BLD AUTO: 0 10E9/L (ref 0–0.7)
EOSINOPHIL NFR BLD AUTO: 0 %
ERYTHROCYTE [DISTWIDTH] IN BLOOD BY AUTOMATED COUNT: 13.4 % (ref 10–15)
GFR SERPL CREATININE-BSD FRML MDRD: 54 ML/MIN/{1.73_M2}
GLUCOSE SERPL-MCNC: 112 MG/DL (ref 70–99)
GLUCOSE UR STRIP-MCNC: NEGATIVE MG/DL
HCT VFR BLD AUTO: 39.1 % (ref 35–47)
HGB BLD-MCNC: 13.1 G/DL (ref 11.7–15.7)
HGB UR QL STRIP: NEGATIVE
HYALINE CASTS #/AREA URNS LPF: 3 /LPF (ref 0–2)
IMM GRANULOCYTES # BLD: 0.1 10E9/L (ref 0–0.4)
IMM GRANULOCYTES NFR BLD: 0.5 %
INTERPRETATION ECG - MUSE: NORMAL
KETONES UR STRIP-MCNC: 10 MG/DL
LEUKOCYTE ESTERASE UR QL STRIP: NEGATIVE
LIPASE SERPL-CCNC: 142 U/L (ref 73–393)
LYMPHOCYTES # BLD AUTO: 0.7 10E9/L (ref 0.8–5.3)
LYMPHOCYTES NFR BLD AUTO: 7.1 %
MCH RBC QN AUTO: 30.7 PG (ref 26.5–33)
MCHC RBC AUTO-ENTMCNC: 33.5 G/DL (ref 31.5–36.5)
MCV RBC AUTO: 92 FL (ref 78–100)
MONOCYTES # BLD AUTO: 0.9 10E9/L (ref 0–1.3)
MONOCYTES NFR BLD AUTO: 8.6 %
MUCOUS THREADS #/AREA URNS LPF: PRESENT /LPF
NEUTROPHILS # BLD AUTO: 8.7 10E9/L (ref 1.6–8.3)
NEUTROPHILS NFR BLD AUTO: 83.8 %
NITRATE UR QL: NEGATIVE
NRBC # BLD AUTO: 0 10*3/UL
NRBC BLD AUTO-RTO: 0 /100
PH UR STRIP: 7 PH (ref 5–7)
PLATELET # BLD AUTO: 172 10E9/L (ref 150–450)
POTASSIUM SERPL-SCNC: 5 MMOL/L (ref 3.4–5.3)
PROT SERPL-MCNC: 7.6 G/DL (ref 6.8–8.8)
RBC # BLD AUTO: 4.27 10E12/L (ref 3.8–5.2)
RBC #/AREA URNS AUTO: 3 /HPF (ref 0–2)
SODIUM SERPL-SCNC: 126 MMOL/L (ref 133–144)
SOURCE: ABNORMAL
SP GR UR STRIP: 1.01 (ref 1–1.03)
SQUAMOUS #/AREA URNS AUTO: <1 /HPF (ref 0–1)
TROPONIN I SERPL-MCNC: <0.015 UG/L (ref 0–0.04)
UROBILINOGEN UR STRIP-MCNC: NORMAL MG/DL (ref 0–2)
WBC # BLD AUTO: 10.3 10E9/L (ref 4–11)
WBC #/AREA URNS AUTO: 1 /HPF (ref 0–5)

## 2018-12-20 PROCEDURE — 83690 ASSAY OF LIPASE: CPT | Performed by: FAMILY MEDICINE

## 2018-12-20 PROCEDURE — G0378 HOSPITAL OBSERVATION PER HR: HCPCS

## 2018-12-20 PROCEDURE — 25000128 H RX IP 250 OP 636: Performed by: FAMILY MEDICINE

## 2018-12-20 PROCEDURE — 96361 HYDRATE IV INFUSION ADD-ON: CPT | Performed by: FAMILY MEDICINE

## 2018-12-20 PROCEDURE — 99219 ZZC INITIAL OBSERVATION CARE,LEVL II: CPT | Mod: Z6 | Performed by: NURSE PRACTITIONER

## 2018-12-20 PROCEDURE — 96376 TX/PRO/DX INJ SAME DRUG ADON: CPT | Performed by: FAMILY MEDICINE

## 2018-12-20 PROCEDURE — A9270 NON-COVERED ITEM OR SERVICE: HCPCS | Mod: GY | Performed by: NURSE PRACTITIONER

## 2018-12-20 PROCEDURE — 81001 URINALYSIS AUTO W/SCOPE: CPT | Performed by: FAMILY MEDICINE

## 2018-12-20 PROCEDURE — 80053 COMPREHEN METABOLIC PANEL: CPT | Performed by: FAMILY MEDICINE

## 2018-12-20 PROCEDURE — 96361 HYDRATE IV INFUSION ADD-ON: CPT

## 2018-12-20 PROCEDURE — 96374 THER/PROPH/DIAG INJ IV PUSH: CPT | Performed by: FAMILY MEDICINE

## 2018-12-20 PROCEDURE — 25000132 ZZH RX MED GY IP 250 OP 250 PS 637: Mod: GY | Performed by: NURSE PRACTITIONER

## 2018-12-20 PROCEDURE — 99285 EMERGENCY DEPT VISIT HI MDM: CPT | Mod: 25 | Performed by: FAMILY MEDICINE

## 2018-12-20 PROCEDURE — 85025 COMPLETE CBC W/AUTO DIFF WBC: CPT | Performed by: FAMILY MEDICINE

## 2018-12-20 PROCEDURE — 93005 ELECTROCARDIOGRAM TRACING: CPT | Performed by: FAMILY MEDICINE

## 2018-12-20 PROCEDURE — 93010 ELECTROCARDIOGRAM REPORT: CPT | Mod: Z6 | Performed by: FAMILY MEDICINE

## 2018-12-20 PROCEDURE — 84484 ASSAY OF TROPONIN QUANT: CPT | Performed by: FAMILY MEDICINE

## 2018-12-20 RX ORDER — TIMOLOL MALEATE 5 MG/ML
1 SOLUTION/ DROPS OPHTHALMIC 2 TIMES DAILY
Status: DISCONTINUED | OUTPATIENT
Start: 2018-12-20 | End: 2018-12-22 | Stop reason: HOSPADM

## 2018-12-20 RX ORDER — LOTEPREDNOL ETABONATE 5 MG/G
1 GEL OPHTHALMIC EVERY EVENING
Status: DISCONTINUED | OUTPATIENT
Start: 2018-12-20 | End: 2018-12-22 | Stop reason: HOSPADM

## 2018-12-20 RX ORDER — BRIMONIDINE TARTRATE 2 MG/ML
1 SOLUTION/ DROPS OPHTHALMIC 3 TIMES DAILY
Status: DISCONTINUED | OUTPATIENT
Start: 2018-12-20 | End: 2018-12-22 | Stop reason: HOSPADM

## 2018-12-20 RX ORDER — ONDANSETRON 2 MG/ML
4 INJECTION INTRAMUSCULAR; INTRAVENOUS ONCE
Status: COMPLETED | OUTPATIENT
Start: 2018-12-20 | End: 2018-12-20

## 2018-12-20 RX ORDER — DORZOLAMIDE HCL 20 MG/ML
1 SOLUTION/ DROPS OPHTHALMIC 2 TIMES DAILY
Status: DISCONTINUED | OUTPATIENT
Start: 2018-12-20 | End: 2018-12-22 | Stop reason: HOSPADM

## 2018-12-20 RX ORDER — HYDROCODONE BITARTRATE AND ACETAMINOPHEN 5; 325 MG/1; MG/1
1-2 TABLET ORAL EVERY 4 HOURS PRN
Status: DISCONTINUED | OUTPATIENT
Start: 2018-12-20 | End: 2018-12-22 | Stop reason: HOSPADM

## 2018-12-20 RX ORDER — LIDOCAINE 40 MG/G
CREAM TOPICAL
Status: DISCONTINUED | OUTPATIENT
Start: 2018-12-20 | End: 2018-12-22 | Stop reason: HOSPADM

## 2018-12-20 RX ORDER — LEVETIRACETAM 250 MG/1
500 TABLET ORAL EVERY EVENING
Status: DISCONTINUED | OUTPATIENT
Start: 2018-12-20 | End: 2018-12-22 | Stop reason: HOSPADM

## 2018-12-20 RX ORDER — LIDOCAINE 40 MG/G
CREAM TOPICAL
Status: DISCONTINUED | OUTPATIENT
Start: 2018-12-20 | End: 2018-12-20

## 2018-12-20 RX ORDER — ACETAMINOPHEN 650 MG/1
650 SUPPOSITORY RECTAL EVERY 4 HOURS PRN
Status: DISCONTINUED | OUTPATIENT
Start: 2018-12-20 | End: 2018-12-22 | Stop reason: HOSPADM

## 2018-12-20 RX ORDER — CEPHALEXIN 500 MG/1
500 CAPSULE ORAL 2 TIMES DAILY
Status: DISCONTINUED | OUTPATIENT
Start: 2018-12-20 | End: 2018-12-22 | Stop reason: HOSPADM

## 2018-12-20 RX ORDER — SODIUM CHLORIDE 9 MG/ML
1000 INJECTION, SOLUTION INTRAVENOUS CONTINUOUS
Status: DISCONTINUED | OUTPATIENT
Start: 2018-12-20 | End: 2018-12-21

## 2018-12-20 RX ORDER — ONDANSETRON 2 MG/ML
4 INJECTION INTRAMUSCULAR; INTRAVENOUS EVERY 30 MIN PRN
Status: DISCONTINUED | OUTPATIENT
Start: 2018-12-20 | End: 2018-12-21

## 2018-12-20 RX ORDER — ACETAMINOPHEN 325 MG/1
650 TABLET ORAL EVERY 4 HOURS PRN
Status: DISCONTINUED | OUTPATIENT
Start: 2018-12-20 | End: 2018-12-22 | Stop reason: HOSPADM

## 2018-12-20 RX ORDER — ONDANSETRON 4 MG/1
4 TABLET, ORALLY DISINTEGRATING ORAL EVERY 6 HOURS PRN
Status: DISCONTINUED | OUTPATIENT
Start: 2018-12-20 | End: 2018-12-21

## 2018-12-20 RX ORDER — LISINOPRIL 20 MG/1
40 TABLET ORAL DAILY
Status: DISCONTINUED | OUTPATIENT
Start: 2018-12-21 | End: 2018-12-22 | Stop reason: HOSPADM

## 2018-12-20 RX ORDER — OXYBUTYNIN CHLORIDE 5 MG/1
5 TABLET, EXTENDED RELEASE ORAL DAILY
Status: DISCONTINUED | OUTPATIENT
Start: 2018-12-21 | End: 2018-12-22 | Stop reason: HOSPADM

## 2018-12-20 RX ORDER — NALOXONE HYDROCHLORIDE 0.4 MG/ML
.1-.4 INJECTION, SOLUTION INTRAMUSCULAR; INTRAVENOUS; SUBCUTANEOUS
Status: DISCONTINUED | OUTPATIENT
Start: 2018-12-20 | End: 2018-12-22 | Stop reason: HOSPADM

## 2018-12-20 RX ORDER — ONDANSETRON 2 MG/ML
4 INJECTION INTRAMUSCULAR; INTRAVENOUS EVERY 6 HOURS PRN
Status: DISCONTINUED | OUTPATIENT
Start: 2018-12-20 | End: 2018-12-21

## 2018-12-20 RX ADMIN — SODIUM CHLORIDE 1000 ML: 9 INJECTION, SOLUTION INTRAVENOUS at 11:56

## 2018-12-20 RX ADMIN — LOTEPREDNOL ETABONATE 1 DROP: 5 GEL OPHTHALMIC at 21:33

## 2018-12-20 RX ADMIN — SODIUM CHLORIDE 1000 ML: 9 INJECTION, SOLUTION INTRAVENOUS at 15:39

## 2018-12-20 RX ADMIN — LEVETIRACETAM 500 MG: 250 TABLET, FILM COATED ORAL at 21:30

## 2018-12-20 RX ADMIN — ONDANSETRON 4 MG: 2 INJECTION INTRAMUSCULAR; INTRAVENOUS at 11:56

## 2018-12-20 RX ADMIN — CEPHALEXIN 500 MG: 500 CAPSULE ORAL at 21:31

## 2018-12-20 RX ADMIN — SODIUM CHLORIDE 1000 ML: 9 INJECTION, SOLUTION INTRAVENOUS at 16:55

## 2018-12-20 RX ADMIN — ONDANSETRON 4 MG: 2 INJECTION INTRAMUSCULAR; INTRAVENOUS at 15:51

## 2018-12-20 RX ADMIN — BRIMONIDINE TARTRATE 1 DROP: 2 SOLUTION/ DROPS OPHTHALMIC at 21:33

## 2018-12-20 RX ADMIN — TIMOLOL MALEATE 1 DROP: 5 SOLUTION/ DROPS OPHTHALMIC at 21:33

## 2018-12-20 RX ADMIN — SODIUM CHLORIDE 1000 ML: 9 INJECTION, SOLUTION INTRAVENOUS at 21:56

## 2018-12-20 RX ADMIN — DORZOLAMIDE HCL 1 DROP: 20 SOLUTION/ DROPS OPHTHALMIC at 21:33

## 2018-12-20 ASSESSMENT — ENCOUNTER SYMPTOMS
FATIGUE: 1
SHORTNESS OF BREATH: 0
COLOR CHANGE: 0
VOMITING: 1
DYSPHORIC MOOD: 0
HEADACHES: 0
WEAKNESS: 1
DIARRHEA: 0
APPETITE CHANGE: 1
BRUISES/BLEEDS EASILY: 0
EYE REDNESS: 0
FEVER: 0
DECREASED CONCENTRATION: 1
DIFFICULTY URINATING: 0
COUGH: 0
ABDOMINAL PAIN: 0
NAUSEA: 1
CONFUSION: 0
JOINT SWELLING: 0
NECK STIFFNESS: 0
DYSURIA: 0
LIGHT-HEADEDNESS: 0
FACIAL SWELLING: 0
ARTHRALGIAS: 0

## 2018-12-20 ASSESSMENT — MIFFLIN-ST. JEOR: SCORE: 947.06

## 2018-12-20 NOTE — ED PROVIDER NOTES
History     Chief Complaint   Patient presents with     Nausea & Vomiting     HPI  Amna Herrera is a 89 year old female with a history of hypertension, who presents to the Emergency Department for evaluation of nausea and emesis. Patient had a left globe evisceration performed yesterday for a globe rupture. Patient reports that she had been npo since Tuesday night, 12/18/18, the day prior to her surgery. However, complains of ongoing nausea and loss of appetite prior to this. Patient attempted oral fluid intake following the procedure, but has been unable to tolerate anything. She denies any headache, cough, shortness of breath, chest pain, or abdominal pain.  Patient denies any eye pain at all.  No fevers or chills etc.  Some mild weakness noted but no neurological focal complaints noted.    I have reviewed the Medications, Allergies, Past Medical and Surgical History, and Social History in the [employer] system.    Past Medical History:   Diagnosis Date     Corneal dystrophy      Hypertension        Past Surgical History:   Procedure Laterality Date     BIOPSY BREAST       C ANESTH,CORNEAL TRANSPLANT       CATARACT IOL, RT/LT       DSAEK Right 06/29/2011     EVISCERATION EYE Left 12/19/2018    Procedure: LEFT EYE EVISCERATION IMPLANT;  Surgeon: Cristobal Duque MD;  Location: [facility] OR     KERATOPLASTY DESCEMETS STRIPPING ENDOTHELIAL (DSEK)  8/31/2011    Procedure:KERATOPLASTY LAMELLAR DESCEMET'S STRIPPING; Descemet's Stripping Automated Endothelial Keratoplasty Right Eye; Surgeon:SHENG IRAHETA; Location:[facility] OR     LAPAROSCOPY  Diagnostic Laparoscopy for abnormal ovary     removal of melanoma L arm       tumor removal wrist         History reviewed. No pertinent family history.    Social History     Tobacco Use     Smoking status: Former Smoker     Smokeless tobacco: Never Used   Substance Use Topics     Alcohol use: Yes     Comment: rare     Current Facility-Administered Medications   Medication      acetaminophen (TYLENOL) Suppository 650 mg     acetaminophen (TYLENOL) tablet 650 mg     bimatoprost (LUMIGAN) 0.01 % ophthalmic drops 1 drop     brimonidine (ALPHAGAN) 0.2 % ophthalmic solution 1 drop     cephALEXin (KEFLEX) capsule 500 mg     dorzolamide (TRUSOPT) 2 % ophthalmic solution 1 drop     HYDROcodone-acetaminophen (NORCO) 5-325 MG per tablet 1-2 tablet     levETIRAcetam (KEPPRA) tablet 500 mg     lidocaine (LMX4) cream     lidocaine 1 % 1 mL     [START ON 12/21/2018] lisinopril (PRINIVIL/ZESTRIL) tablet 40 mg     Loteprednol Etabonate 0.5 % GEL 1 drop     melatonin tablet 1 mg     naloxone (NARCAN) injection 0.1-0.4 mg     ondansetron (ZOFRAN) injection 4 mg     ondansetron (ZOFRAN-ODT) ODT tab 4 mg    Or     ondansetron (ZOFRAN) injection 4 mg     [START ON 12/21/2018] oxybutynin ER (DITROPAN-XL) 24 hr tablet 5 mg     sodium chloride (PF) 0.9% PF flush 3 mL     sodium chloride (PF) 0.9% PF flush 3 mL     sodium chloride 0.9% infusion     timolol maleate (TIMOPTIC) 0.5 % ophthalmic solution 1 drop        Allergies   Allergen Reactions     Eggs Swelling       Review of Systems   Constitutional: Positive for appetite change and fatigue. Negative for fever.   HENT: Negative for congestion, drooling, ear discharge, facial swelling and nosebleeds.    Eyes: Negative for redness. Visual disturbance: left eye eviseration.   Respiratory: Negative for cough and shortness of breath.    Cardiovascular: Negative for chest pain.   Gastrointestinal: Positive for nausea and vomiting. Negative for abdominal pain and diarrhea.   Genitourinary: Negative for difficulty urinating and dysuria.   Musculoskeletal: Negative for arthralgias, gait problem, joint swelling and neck stiffness.   Skin: Negative for color change and rash.   Allergic/Immunologic: Negative for immunocompromised state.   Neurological: Positive for weakness. Negative for syncope, light-headedness and headaches.   Hematological: Does not bruise/bleed  "easily.   Psychiatric/Behavioral: Positive for decreased concentration. Negative for confusion and dysphoric mood.   All other systems reviewed and are negative.      Physical Exam   BP: 178/67  Pulse: 82  Heart Rate: 73  Temp: 98.2  F (36.8  C)  Resp: 16  Height: 149.9 cm (4' 11\")  Weight: 62.1 kg (137 lb)  SpO2: 99 %      Physical Exam   Constitutional: She is oriented to person, place, and time. She appears well-developed and well-nourished. She appears distressed.   Patient noted just minimal distress she is alert and oriented dressing to the left eye/patch noted without any obvious swelling.  Patient's son is here also gives history.   HENT:   Head: Normocephalic and atraumatic.   Dry mucous membranes noted   Eyes: No scleral icterus.   Left eye dressing noted.  Right eye appears normal.  No pain with extra movements   Neck: Normal range of motion. Neck supple. No JVD present.   Cardiovascular: Normal rate and regular rhythm.   Pulmonary/Chest: No stridor. No respiratory distress.   Abdominal: She exhibits no distension and no mass. There is no tenderness. There is no guarding.   Musculoskeletal: She exhibits no edema, tenderness or deformity.   Neurological: She is alert and oriented to person, place, and time. She displays normal reflexes. No cranial nerve deficit. She exhibits normal muscle tone. Coordination normal.   Nonfocal   Skin: Skin is warm and dry. Capillary refill takes less than 2 seconds. No rash noted. She is not diaphoretic. No erythema. No pallor.   Psychiatric:   Affect appropriate otherwise is mildly flattened.   Nursing note and vitals reviewed.      ED Course        Procedures         Patient evaluated the ER.  IV established 2 L normal saline bolus given 2 doses of Zofran IV given some improvement noted.  EKG did not show ischemic changes.  Laboratory testing did not show any significant findings troponin negative white count 10.3.  Hemoglobin is 13.1.  Urinalysis negative for " infection.  Discussed case with ophthalmology also feel at this point symptoms may be that of postanesthesia versus other underlying etiology but nothing particular for concern from her eye surgery as patient has no pain etc.    Here in the ER patient is feeling somewhat better able to take orally slightly still feels slightly weak her sodium was noted to be 126 other labs were normal.  Patient admitted to ED observation overnight for ongoing hydration treating her nausea and reassessing ophthalmology consult also.  Patient and son agree with plan.         EKG Interpretation:      Interpreted by Marcus Ratliff  Time reviewed: 1157  Symptoms at time of EKG: nausea and vomiting     Rhythm: normal sinus   Rate: normal  Axis: normal  Ectopy: none  Conduction: 1st degree AV block  ST Segments/ T Waves: Nonspecific ST-T wave changes  Q Waves: none  Comparison to prior: No old EKG available    Clinical Impression:nsr first degree block          Critical Care time:  none             Labs Ordered and Resulted from Time of ED Arrival Up to the Time of Departure from the ED   CBC WITH PLATELETS DIFFERENTIAL - Abnormal; Notable for the following components:       Result Value    Absolute Neutrophil 8.7 (*)     Absolute Lymphocytes 0.7 (*)     All other components within normal limits   COMPREHENSIVE METABOLIC PANEL - Abnormal; Notable for the following components:    Sodium 126 (*)     Chloride 91 (*)     Glucose 112 (*)     GFR Estimate 54 (*)     All other components within normal limits   ROUTINE UA WITH MICROSCOPIC REFLEX TO CULTURE - Abnormal; Notable for the following components:    Ketones Urine 10 (*)     Protein Albumin Urine 30 (*)     RBC Urine 3 (*)     Mucous Urine Present (*)     Hyaline Casts 3 (*)     All other components within normal limits   LIPASE   TROPONIN I   PERIPHERAL IV CATHETER   PULSE OXIMETRY NURSING     Results for orders placed or performed during the hospital encounter of 12/20/18   CBC with  platelets differential   Result Value Ref Range    WBC 10.3 4.0 - 11.0 10e9/L    RBC Count 4.27 3.8 - 5.2 10e12/L    Hemoglobin 13.1 11.7 - 15.7 g/dL    Hematocrit 39.1 35.0 - 47.0 %    MCV 92 78 - 100 fl    MCH 30.7 26.5 - 33.0 pg    MCHC 33.5 31.5 - 36.5 g/dL    RDW 13.4 10.0 - 15.0 %    Platelet Count 172 150 - 450 10e9/L    Diff Method Automated Method     % Neutrophils 83.8 %    % Lymphocytes 7.1 %    % Monocytes 8.6 %    % Eosinophils 0.0 %    % Basophils 0.0 %    % Immature Granulocytes 0.5 %    Nucleated RBCs 0 0 /100    Absolute Neutrophil 8.7 (H) 1.6 - 8.3 10e9/L    Absolute Lymphocytes 0.7 (L) 0.8 - 5.3 10e9/L    Absolute Monocytes 0.9 0.0 - 1.3 10e9/L    Absolute Eosinophils 0.0 0.0 - 0.7 10e9/L    Absolute Basophils 0.0 0.0 - 0.2 10e9/L    Abs Immature Granulocytes 0.1 0 - 0.4 10e9/L    Absolute Nucleated RBC 0.0    Comprehensive metabolic panel   Result Value Ref Range    Sodium 126 (L) 133 - 144 mmol/L    Potassium 5.0 3.4 - 5.3 mmol/L    Chloride 91 (L) 94 - 109 mmol/L    Carbon Dioxide 26 20 - 32 mmol/L    Anion Gap 9 3 - 14 mmol/L    Glucose 112 (H) 70 - 99 mg/dL    Urea Nitrogen 20 7 - 30 mg/dL    Creatinine 0.93 0.52 - 1.04 mg/dL    GFR Estimate 54 (L) >60 mL/min/[1.73_m2]    GFR Estimate If Black 63 >60 mL/min/[1.73_m2]    Calcium 8.8 8.5 - 10.1 mg/dL    Bilirubin Total 1.0 0.2 - 1.3 mg/dL    Albumin 4.1 3.4 - 5.0 g/dL    Protein Total 7.6 6.8 - 8.8 g/dL    Alkaline Phosphatase 61 40 - 150 U/L    ALT 19 0 - 50 U/L    AST 44 0 - 45 U/L   Lipase   Result Value Ref Range    Lipase 142 73 - 393 U/L   Troponin I   Result Value Ref Range    Troponin I ES <0.015 0.000 - 0.045 ug/L   UA with Microscopic reflex to Culture   Result Value Ref Range    Color Urine Light Yellow     Appearance Urine Clear     Glucose Urine Negative NEG^Negative mg/dL    Bilirubin Urine Negative NEG^Negative    Ketones Urine 10 (A) NEG^Negative mg/dL    Specific Gravity Urine 1.009 1.003 - 1.035    Blood Urine Negative  NEG^Negative    pH Urine 7.0 5.0 - 7.0 pH    Protein Albumin Urine 30 (A) NEG^Negative mg/dL    Urobilinogen mg/dL Normal 0.0 - 2.0 mg/dL    Nitrite Urine Negative NEG^Negative    Leukocyte Esterase Urine Negative NEG^Negative    Source Midstream Urine     WBC Urine 1 0 - 5 /HPF    RBC Urine 3 (H) 0 - 2 /HPF    Squamous Epithelial /HPF Urine <1 0 - 1 /HPF    Mucous Urine Present (A) NEG^Negative /LPF    Hyaline Casts 3 (H) 0 - 2 /LPF   EKG 12-lead, tracing only   Result Value Ref Range    Interpretation ECG Click View Image link to view waveform and result             Assessments & Plan (with Medical Decision Making)  89-year-old female status post left eye evisceration yesterday by ophthalmology.  Patient without eye pain has had nausea vomiting with mild weakness noted the last couple days.  It may be related to anesthesia her urinalysis was negative for UTI cardiac evaluation was negative.  Lipase liver function is normal sodium 126 other labs normal.  Patient given IV fluids Zofran feeling somewhat better.  She has no other complaints as far as pain etc.  Patient admitted to ED observation overnight for hydration and recheck in sodium in the morning and controlling her nausea with Zofran advancing diet ophthalmology consult.  Patient and son agree with plan.           I have reviewed the nursing notes.    I have reviewed the findings, diagnosis, plan and need for follow up with the patient.       Medication List      There are no discharge medications for this visit.         Final diagnoses:   Nausea & vomiting     Zena MCDONALD, am serving as a trained medical scribe to document services personally performed by Marcus Ratliff MD, based on the provider's statements to me.   Marcus MCDONALD MD, was physically present and have reviewed and verified the accuracy of this note documented by Zena Maloney.    12/20/2018   [facility], [place], EMERGENCY DEPARTMENT     Marcus Ratliff MD  12/20/18 7601

## 2018-12-20 NOTE — TELEPHONE ENCOUNTER
POD1  A telephone call was made to Amna Herrera to make sure the post operative course has been uneventful and that all questions were answered. There was no answer and a telephone message was left.    Cristobal Duque

## 2018-12-20 NOTE — TELEPHONE ENCOUNTER
Pt using zofran since yesterday evening following nausea post-op evisceration yesterday     Nausea, dry heaves persist ant.  No improvement with zofran    Eye comfortable now per pt/son    Pt has not ate since day before surgery and has been unable to keep fluids down    Concern of dehydration    Recommended evaluation at urgent care  Pt/son prefer [employer] system and would like to go to an emergency room    Reviewed with oculo-plastics clinic and either would be ok    Pt will f/u in ED/urgent care setting today for management of nausea post-op/dehydration    Satya Macario RN 9:45 AM 12/20/18

## 2018-12-20 NOTE — ED TRIAGE NOTES
89 year old female s/p left eye enucleation yesterday, is presented by son with complaints of nausea and vomiting since 2330 last night.

## 2018-12-20 NOTE — H&P
"[facility]   History & Physical    Amna Herrera MRN# [id number]   Age: 89 year old YOB: 1928     Date of Admission: 12/20/2018    Primary Care Provider: Rey Cherry         Chief Complaint:   \"Nausea and vomiting\"         History of Present Illness:   Amna Herrera is a 89 year old female w/PMH significant for hypertension and surgical evisceration of left eye due to globe rupture and pain secondary to phthisis who presented to the ED with nausea and vomiting. Patient had a left globe evisceration performed yesterday. She has been NPO since Tuesday night, 12/18/18, the day prior to her surgery. However, complains of ongoing nausea and loss of appetite prior to this. Patient attempted oral fluid intake following the procedure, but has been unable to tolerate anything. She denies any headache, cough, shortness of breath, chest pain, or abdominal pain. She presents with her son, Tulio.    In the ED the patient's vital signs were stable, she was afebrile.  Labs: Na 126, K+ 5.0, Cr 0.93, BUN 20. Lipase 142. WBC 10.3, Hgb 13.1, hematocrit 39.1.  EKG shows sinus rhythm with first degree AV Block.  She was given bolus IV fluids x2 and zofran X 2.  She continued to have nausea and was subsequently admitted to the observation unit.           Review of Systems:     All others reviewed and are negative         Past Medical History:     Past Medical History:   Diagnosis Date     Corneal dystrophy      Hypertension              Past Surgical History:      Past Surgical History:   Procedure Laterality Date     BIOPSY BREAST       C ANESTH,CORNEAL TRANSPLANT       CATARACT IOL, RT/LT       DSAEK Right 06/29/2011     EVISCERATION EYE Left 12/19/2018    Procedure: LEFT EYE EVISCERATION IMPLANT;  Surgeon: Cristobal Duque MD;  Location: [facility] OR     KERATOPLASTY DESCEMETS STRIPPING ENDOTHELIAL (DSEK)  8/31/2011    Procedure:KERATOPLASTY LAMELLAR DESCEMET'S " STRIPPING; Descemet's Stripping Automated Endothelial Keratoplasty Right Eye; Surgeon:SHENG IRAHETA; Location:[facility] OR     LAPAROSCOPY  Diagnostic Laparoscopy for abnormal ovary     removal of melanoma L arm       tumor removal wrist               Family History:   History reviewed. No pertinent family history.          Social History:     Social History     Tobacco Use     Smoking status: Former Smoker     Smokeless tobacco: Never Used   Substance Use Topics     Alcohol use: Yes     Comment: rare             Medications:     Current Facility-Administered Medications on File Prior to Encounter:  [DISCONTINUED] HYDROmorphone (PF) (DILAUDID) injection 0.3-0.5 mg   [DISCONTINUED] lactated ringers infusion   [DISCONTINUED] meperidine (DEMEROL) injection 12.5 mg   [DISCONTINUED] naloxone (NARCAN) injection 0.1-0.4 mg   [DISCONTINUED] ondansetron (ZOFRAN) injection 4 mg   [DISCONTINUED] ondansetron (ZOFRAN-ODT) ODT tab 4 mg   [DISCONTINUED] ORAL Pain Medications -  may administer as ordered by surgeon for take home use   [DISCONTINUED] oxyCODONE (ROXICODONE) tablet 5 mg   [DISCONTINUED] prochlorperazine (COMPAZINE) injection 5 mg     Current Outpatient Medications on File Prior to Encounter:  aspirin 81 MG EC tablet Take 81 mg by mouth once   brimonidine (ALPHAGAN) 0.2 % ophthalmic solution INSTILL ONE DROP IN RIGHT EYE THREE TIMES DAILY   cephALEXin (KEFLEX) 500 MG capsule Take 1 capsule (500 mg) by mouth 2 times daily for 10 days   cholecalciferol (VITAMIN D) 400 UNIT TABS Take 400 Units by mouth daily.   dexamethasone (DECADRON) 0.1 % ophthalmic suspension Place 1 drop into both eyes 3 times daily.   dorzolamide (TRUSOPT) 2 % ophthalmic solution INSTILL 1 DROP INTO RIGHT EYE 2 TIMES DAILY.   erythromycin (ROMYCIN) 5 MG/GM ophthalmic ointment Apply small amount to eye socket three times a day once eye patch removed   HYDROcodone-acetaminophen (NORCO) 5-325 MG tablet Take 1-2 tablets by mouth every 4 hours as  "needed for moderate to severe pain   latanoprost (XALATAN) 0.005 % ophthalmic solution Place 1 drop Into the left eye At Bedtime.   levETIRAcetam (KEPPRA) 500 MG tablet Take 500 mg by mouth daily   lisinopril (PRINIVIL,ZESTRIL) 40 MG tablet Take 40 mg by mouth daily.   LOTEMAX 0.5 % GEL INSTILL ONE DROP IN RIGHT EYE ONCE DAILY.   LUMIGAN 0.01 % SOLN ophthalmic solution INSTILL 1 DROP INTO RIGHT EYE ONCE DAILY IN THE EVENING.   Multiple Vitamin (MULTIVITAMIN) per tablet Take 1 tablet by mouth daily.   ondansetron (ZOFRAN) 4 MG tablet Take 1 tablet (4 mg) by mouth every 8 hours as needed for nausea   proparacaine (ALCAINE) 0.5 % ophthalmic solution INSTILL ONE DROP IN LEFT EYE AS NEEDED FOR SEVERE EYE PAIN NOT TO EXCEED 3 DOSES PER DAY   timolol maleate (TIMOPTIC) 0.5 % ophthalmic solution INSTILL 1 DROP INTO RIGHT EYE 2 TIMES DAILY.   magnesium hydroxide (MILK OF MAGNESIA) 400 MG/5ML suspension Take  by mouth daily as needed.   oxybutynin ER (DITROPAN-XL) 5 MG 24 hr tablet Take 5 mg by mouth daily   traMADol (ULTRAM) 50 MG tablet TAKE ONE TABLET BY MOUTH EVERY 8 HOURS AS NEEDED FOR PAIN   zoster vaccine recombinant adjuvanted (SHINGRIX) injection INJECT 0.5ML INTRAMUSCULLAR LY.            Allergies:     Allergies   Allergen Reactions     Eggs Swelling             Physical Exam:   /70   Pulse 75   Temp 98.2  F (36.8  C) (Oral)   Resp 13   Ht 1.499 m (4' 11\")   Wt 62.1 kg (137 lb)   SpO2 97%   BMI 27.67 kg/m     GENERAL: Alert and oriented x 3. NAD.   HEENT: Anicteric sclera. PRRL. Mucous membranes moist and without lesions. Occlusive dressing over left eye from surgery 12/19/18.   CV: RRR. S1, S2. No murmurs appreciated.   RESPIRATORY: Effort normal. Lungs CTAB with no wheezing, rales, rhonchi.   GI: Abdomen soft and non distended with normoactive bowel sounds present in all quadrants. No tenderness, no rebound, no guarding.   MUSCULOSKELETAL: No joint swelling or tenderness. Moves all extremities. "   NEUROLOGICAL: No focal deficits. Strength 5/5 bilaterally in upper and lower extremities.   EXTREMITIES: No peripheral edema. Intact bilateral pedal pulses.   SKIN: No jaundice. No rashes.          Labs:   CBC:  Recent Labs   Lab Test 12/20/18  1148   WBC 10.3   RBC 4.27   HGB 13.1   HCT 39.1   MCV 92   MCH 30.7   MCHC 33.5   RDW 13.4          CMP:  Recent Labs   Lab Test 12/20/18  1148   *   POTASSIUM 5.0   CHLORIDE 91*   ADRIEL 8.8   CO2 26   BUN 20   CR 0.93   *   AST 44   ALT 19   BILITOTAL 1.0   ALBUMIN 4.1   PROTTOTAL 7.6   ALKPHOS 61       INR:   No results for input(s): INR in the last 99135 hours.         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 She has been unable to advance her diet post-operatively due to nausea and vomiting. She is afebrile, no white count, no other concerning signs of infection at this time. She does not have abdominal pain, tenderness, or guarding. Plan for admission to ED Observation unit overnight for monitoring of nausea, vomiting and correction of hyponatremia.     1. Nausea/Vomiting   -Admit to ED Observation Unit  -Vitals per routine  -IVF at 125ml/hr  -Zofran  -Clear liquid diet overnight    2. Hyponatremia  -IVF at 125ml/hr overnight  -Recheck BMP in AM    Chronic Medical Problems  #Surgical evisceration of left eye: 12/19/18. Occlusive dressing in place. Will contact ophthalmology tomorrow to verify when dressing removed and use of erythromycin drops. Continue PTA Norco for pain PRN. PTA Keflex for shanon-operative prophylaxis.    #HTN: continue PTA lisinopril  #Anxiety  #Hearing loss  #Memory loss  #Stress Incontinence: continue PTA oxybutynin  #Seizure disorder: continue PTA Keppra  #Glaucoma    Discussed with Dr. Rojas.     FEN: clear liquids   Prophylaxis:  none. Anticipate short stay and pt is ambulatory.  Code Status: Full      Amina Thomas, APRN, CNP  Ascom #[id number]

## 2018-12-20 NOTE — TELEPHONE ENCOUNTER
[facility]    Phone Message    May a detailed message be left on voicemail: yes    Reason for Call: Other: Pt has not be able to keep anything in since surgery by Dr. Duque 12/19 and concerned. Please call Pt's son Tulio at [phone].      Action Taken: Message routed to:  [facility]

## 2018-12-21 ENCOUNTER — TELEPHONE (OUTPATIENT)
Dept: OPHTHALMOLOGY | Facility: CLINIC | Age: 83
End: 2018-12-21

## 2018-12-21 LAB
ANION GAP SERPL CALCULATED.3IONS-SCNC: 11 MMOL/L (ref 3–14)
BUN SERPL-MCNC: 16 MG/DL (ref 7–30)
CALCIUM SERPL-MCNC: 8 MG/DL (ref 8.5–10.1)
CHLORIDE SERPL-SCNC: 98 MMOL/L (ref 94–109)
CO2 SERPL-SCNC: 21 MMOL/L (ref 20–32)
CREAT SERPL-MCNC: 0.85 MG/DL (ref 0.52–1.04)
ERYTHROCYTE [DISTWIDTH] IN BLOOD BY AUTOMATED COUNT: 13.3 % (ref 10–15)
GFR SERPL CREATININE-BSD FRML MDRD: 60 ML/MIN/{1.73_M2}
GLUCOSE SERPL-MCNC: 88 MG/DL (ref 70–99)
HCT VFR BLD AUTO: 35.5 % (ref 35–47)
HGB BLD-MCNC: 11.7 G/DL (ref 11.7–15.7)
MCH RBC QN AUTO: 30.3 PG (ref 26.5–33)
MCHC RBC AUTO-ENTMCNC: 33 G/DL (ref 31.5–36.5)
MCV RBC AUTO: 92 FL (ref 78–100)
PLATELET # BLD AUTO: 139 10E9/L (ref 150–450)
POTASSIUM SERPL-SCNC: 3.9 MMOL/L (ref 3.4–5.3)
RBC # BLD AUTO: 3.86 10E12/L (ref 3.8–5.2)
SODIUM SERPL-SCNC: 130 MMOL/L (ref 133–144)
WBC # BLD AUTO: 7.5 10E9/L (ref 4–11)

## 2018-12-21 PROCEDURE — 99224 ZZC SUBSEQUENT OBSERVATION CARE,LEVEL I: CPT | Mod: Z6 | Performed by: NURSE PRACTITIONER

## 2018-12-21 PROCEDURE — 25000125 ZZHC RX 250: Performed by: PHYSICIAN ASSISTANT

## 2018-12-21 PROCEDURE — 25000125 ZZHC RX 250: Performed by: NURSE PRACTITIONER

## 2018-12-21 PROCEDURE — 96375 TX/PRO/DX INJ NEW DRUG ADDON: CPT

## 2018-12-21 PROCEDURE — G0378 HOSPITAL OBSERVATION PER HR: HCPCS

## 2018-12-21 PROCEDURE — 25000132 ZZH RX MED GY IP 250 OP 250 PS 637: Mod: GY | Performed by: NURSE PRACTITIONER

## 2018-12-21 PROCEDURE — 93005 ELECTROCARDIOGRAM TRACING: CPT

## 2018-12-21 PROCEDURE — 25000128 H RX IP 250 OP 636: Performed by: NURSE PRACTITIONER

## 2018-12-21 PROCEDURE — 93010 ELECTROCARDIOGRAM REPORT: CPT | Performed by: INTERNAL MEDICINE

## 2018-12-21 PROCEDURE — A9270 NON-COVERED ITEM OR SERVICE: HCPCS | Mod: GY | Performed by: NURSE PRACTITIONER

## 2018-12-21 PROCEDURE — 25000131 ZZH RX MED GY IP 250 OP 636 PS 637: Mod: GY | Performed by: NURSE PRACTITIONER

## 2018-12-21 PROCEDURE — 85027 COMPLETE CBC AUTOMATED: CPT | Performed by: NURSE PRACTITIONER

## 2018-12-21 PROCEDURE — 36415 COLL VENOUS BLD VENIPUNCTURE: CPT | Performed by: NURSE PRACTITIONER

## 2018-12-21 PROCEDURE — 80048 BASIC METABOLIC PNL TOTAL CA: CPT | Performed by: NURSE PRACTITIONER

## 2018-12-21 PROCEDURE — 96361 HYDRATE IV INFUSION ADD-ON: CPT

## 2018-12-21 PROCEDURE — 96376 TX/PRO/DX INJ SAME DRUG ADON: CPT

## 2018-12-21 RX ORDER — METOCLOPRAMIDE HYDROCHLORIDE 5 MG/ML
5 INJECTION INTRAMUSCULAR; INTRAVENOUS EVERY 6 HOURS PRN
Status: DISCONTINUED | OUTPATIENT
Start: 2018-12-21 | End: 2018-12-22 | Stop reason: HOSPADM

## 2018-12-21 RX ORDER — HYDRALAZINE HYDROCHLORIDE 20 MG/ML
5 INJECTION INTRAMUSCULAR; INTRAVENOUS ONCE
Status: COMPLETED | OUTPATIENT
Start: 2018-12-21 | End: 2018-12-21

## 2018-12-21 RX ORDER — PROCHLORPERAZINE 25 MG
12.5 SUPPOSITORY, RECTAL RECTAL EVERY 12 HOURS PRN
Status: DISCONTINUED | OUTPATIENT
Start: 2018-12-21 | End: 2018-12-22 | Stop reason: HOSPADM

## 2018-12-21 RX ORDER — METOCLOPRAMIDE 5 MG/1
5 TABLET ORAL EVERY 6 HOURS PRN
Status: DISCONTINUED | OUTPATIENT
Start: 2018-12-21 | End: 2018-12-22 | Stop reason: HOSPADM

## 2018-12-21 RX ORDER — METOPROLOL TARTRATE 25 MG/1
25 TABLET, FILM COATED ORAL 2 TIMES DAILY
Status: DISCONTINUED | OUTPATIENT
Start: 2018-12-22 | End: 2018-12-22

## 2018-12-21 RX ORDER — ONDANSETRON 4 MG/1
4 TABLET, ORALLY DISINTEGRATING ORAL EVERY 6 HOURS PRN
Status: DISCONTINUED | OUTPATIENT
Start: 2018-12-21 | End: 2018-12-21

## 2018-12-21 RX ORDER — METOPROLOL TARTRATE 1 MG/ML
5 INJECTION, SOLUTION INTRAVENOUS ONCE
Status: COMPLETED | OUTPATIENT
Start: 2018-12-21 | End: 2018-12-21

## 2018-12-21 RX ORDER — PROCHLORPERAZINE MALEATE 5 MG
5 TABLET ORAL EVERY 6 HOURS PRN
Status: DISCONTINUED | OUTPATIENT
Start: 2018-12-21 | End: 2018-12-22 | Stop reason: HOSPADM

## 2018-12-21 RX ORDER — ONDANSETRON 2 MG/ML
4 INJECTION INTRAMUSCULAR; INTRAVENOUS EVERY 6 HOURS PRN
Status: DISCONTINUED | OUTPATIENT
Start: 2018-12-21 | End: 2018-12-21

## 2018-12-21 RX ORDER — ONDANSETRON 4 MG/1
4 TABLET, ORALLY DISINTEGRATING ORAL EVERY 4 HOURS
Status: DISCONTINUED | OUTPATIENT
Start: 2018-12-21 | End: 2018-12-22

## 2018-12-21 RX ORDER — ONDANSETRON 2 MG/ML
4 INJECTION INTRAMUSCULAR; INTRAVENOUS EVERY 4 HOURS
Status: DISCONTINUED | OUTPATIENT
Start: 2018-12-21 | End: 2018-12-22

## 2018-12-21 RX ORDER — AMOXICILLIN 250 MG
1 CAPSULE ORAL 2 TIMES DAILY PRN
Status: DISCONTINUED | OUTPATIENT
Start: 2018-12-21 | End: 2018-12-22 | Stop reason: HOSPADM

## 2018-12-21 RX ORDER — HYDRALAZINE HYDROCHLORIDE 20 MG/ML
10 INJECTION INTRAMUSCULAR; INTRAVENOUS ONCE
Status: COMPLETED | OUTPATIENT
Start: 2018-12-21 | End: 2018-12-21

## 2018-12-21 RX ORDER — SODIUM CHLORIDE 9 MG/ML
INJECTION, SOLUTION INTRAVENOUS CONTINUOUS
Status: DISCONTINUED | OUTPATIENT
Start: 2018-12-21 | End: 2018-12-22

## 2018-12-21 RX ADMIN — PROCHLORPERAZINE EDISYLATE 5 MG: 5 INJECTION INTRAMUSCULAR; INTRAVENOUS at 16:30

## 2018-12-21 RX ADMIN — ONDANSETRON 4 MG: 4 TABLET, ORALLY DISINTEGRATING ORAL at 07:45

## 2018-12-21 RX ADMIN — LOTEPREDNOL ETABONATE 1 DROP: 5 GEL OPHTHALMIC at 21:32

## 2018-12-21 RX ADMIN — DORZOLAMIDE HCL 1 DROP: 20 SOLUTION/ DROPS OPHTHALMIC at 21:32

## 2018-12-21 RX ADMIN — ONDANSETRON 4 MG: 4 TABLET, ORALLY DISINTEGRATING ORAL at 13:10

## 2018-12-21 RX ADMIN — TIMOLOL MALEATE 1 DROP: 5 SOLUTION/ DROPS OPHTHALMIC at 07:49

## 2018-12-21 RX ADMIN — METOPROLOL TARTRATE 5 MG: 1 INJECTION, SOLUTION INTRAVENOUS at 22:01

## 2018-12-21 RX ADMIN — HYDRALAZINE HYDROCHLORIDE 5 MG: 20 INJECTION INTRAMUSCULAR; INTRAVENOUS at 10:21

## 2018-12-21 RX ADMIN — METOROPROLOL TARTRATE 5 MG: 5 INJECTION, SOLUTION INTRAVENOUS at 13:02

## 2018-12-21 RX ADMIN — TIMOLOL MALEATE 1 DROP: 5 SOLUTION/ DROPS OPHTHALMIC at 21:32

## 2018-12-21 RX ADMIN — SENNOSIDES AND DOCUSATE SODIUM 1 TABLET: 8.6; 5 TABLET ORAL at 09:15

## 2018-12-21 RX ADMIN — CEPHALEXIN 500 MG: 500 CAPSULE ORAL at 07:47

## 2018-12-21 RX ADMIN — DORZOLAMIDE HCL 1 DROP: 20 SOLUTION/ DROPS OPHTHALMIC at 07:48

## 2018-12-21 RX ADMIN — BRIMONIDINE TARTRATE 1 DROP: 2 SOLUTION/ DROPS OPHTHALMIC at 07:49

## 2018-12-21 RX ADMIN — ONDANSETRON 4 MG: 4 TABLET, ORALLY DISINTEGRATING ORAL at 17:43

## 2018-12-21 RX ADMIN — LEVETIRACETAM 500 MG: 250 TABLET, FILM COATED ORAL at 21:22

## 2018-12-21 RX ADMIN — SODIUM CHLORIDE: 9 INJECTION, SOLUTION INTRAVENOUS at 17:44

## 2018-12-21 RX ADMIN — HYDRALAZINE HYDROCHLORIDE 10 MG: 20 INJECTION INTRAMUSCULAR; INTRAVENOUS at 12:15

## 2018-12-21 RX ADMIN — BRIMONIDINE TARTRATE 1 DROP: 2 SOLUTION/ DROPS OPHTHALMIC at 21:32

## 2018-12-21 RX ADMIN — ONDANSETRON 4 MG: 4 TABLET, ORALLY DISINTEGRATING ORAL at 21:22

## 2018-12-21 RX ADMIN — LISINOPRIL 40 MG: 20 TABLET ORAL at 07:44

## 2018-12-21 RX ADMIN — OXYBUTYNIN CHLORIDE 5 MG: 5 TABLET, EXTENDED RELEASE ORAL at 07:44

## 2018-12-21 RX ADMIN — BRIMONIDINE TARTRATE 1 DROP: 2 SOLUTION/ DROPS OPHTHALMIC at 13:56

## 2018-12-21 RX ADMIN — CEPHALEXIN 500 MG: 500 CAPSULE ORAL at 21:34

## 2018-12-21 NOTE — PROVIDER NOTIFICATION
"Patient called writer in the room and stated that she felt like her \"pulse was pounding\". Stating she could feel it pounding in her head. Denying chest pain, shortness of breath. Stated that she feels anxious. HR and BP elevated. Called BEVERLY, who arrived at bedside. IV metoprolol and po zofran ordered.   "

## 2018-12-21 NOTE — PLAN OF CARE
Observation goals PRIOR TO DISCHARGE     Comments: -diagnostic tests and consults completed and resulted -- No  -vital signs normal or at patient baseline - No  -tolerating oral intake to maintain hydration -- No  -safe disposition plan has been identified -- TBD  Nurse to notify provider when observation goals have been met and patient is ready for discharge.

## 2018-12-21 NOTE — PROVIDER NOTIFICATION
Notified BEVERLY of patient's blood pressure at 185/86 despite morning lisinopril. One-time dose of hydralazine ordered. Will recheck.     Updated BEVERLY of worsening BP after hydralazine. Pt reporting fear/anxiety; lavender essential oils and warm blanket provided.

## 2018-12-21 NOTE — PHARMACY-ADMISSION MEDICATION HISTORY
Admission medication history interview status for the 12/20/2018 admission is complete. See Epic admission navigator for allergy information, pharmacy, prior to admission medications and immunization status.     Medication history interview sources:  Patient    Changes made to PTA medication list (reason)  Added: Refresh Optive Advanced  Deleted: Proparacaine, erythromycin, latanoprost (these eye drops are no longer needed as patient had left eye removed), Shingrix   Changed: Dexamethasone ( both eyes --> right eye), Milk of Magnesia (added that she is using 15 ml as a dose)    Additional medication history information (including reliability of information, actions taken by pharmacist):  -Patient was a good historian of her medications.   -Stated that aspirin is on hold because of recent eye surgery (12/19/18 per patient) but does plan on restarting once she is ok to do so  -Antibiotic was started this morning and has only taken once dose thus far.       Prior to Admission medications    Medication Sig Last Dose Taking? Auth Provider   aspirin 81 MG EC tablet Take 81 mg by mouth daily  Past Week at Unknown time Yes Reported, Patient   brimonidine (ALPHAGAN) 0.2 % ophthalmic solution INSTILL ONE DROP IN RIGHT EYE THREE TIMES DAILY 12/20/2018 at am Yes Reported, Patient   Carboxymeth-Glycerin-Polysorb (REFRESH OPTIVE ADVANCED) 0.5-1-0.5 % SOLN Place 1 drop into the right eye daily as needed 12/20/2018 at PRN Yes Unknown, Entered By History   cephALEXin (KEFLEX) 500 MG capsule Take 1 capsule (500 mg) by mouth 2 times daily for 10 days 12/20/2018 at am Yes Abel Benedict MD   cholecalciferol (VITAMIN D) 400 UNIT TABS Take 400 Units by mouth daily. 12/20/2018 at am Yes Reported, Patient   dexamethasone (DECADRON) 0.1 % ophthalmic suspension Place 1 drop into the right eye 3 times daily  12/20/2018 at Unknown time Yes Reported, Patient   dorzolamide (TRUSOPT) 2 % ophthalmic solution INSTILL 1 DROP INTO RIGHT EYE 2  TIMES DAILY. 12/20/2018 at Unknown time Yes Reported, Patient   HYDROcodone-acetaminophen (NORCO) 5-325 MG tablet Take 1-2 tablets by mouth every 4 hours as needed for moderate to severe pain PRN at Has not used yet Yes Abel Benedict MD   levETIRAcetam (KEPPRA) 500 MG tablet Take 500 mg by mouth daily 12/19/2018 at pm Yes Reported, Patient   lisinopril (PRINIVIL,ZESTRIL) 40 MG tablet Take 40 mg by mouth daily. 12/20/2018 at am Yes Reported, Patient   LOTEMAX 0.5 % GEL INSTILL ONE DROP IN RIGHT EYE ONCE DAILY. 12/19/2018 at pm Yes Reported, Patient   LUMIGAN 0.01 % SOLN ophthalmic solution INSTILL 1 DROP INTO RIGHT EYE ONCE DAILY IN THE EVENING. 12/19/2018 at pm Yes Reported, Patient   Multiple Vitamin (MULTIVITAMIN) per tablet Take 1 tablet by mouth daily. 12/20/2018 at am Yes Reported, Patient   ondansetron (ZOFRAN) 4 MG tablet Take 1 tablet (4 mg) by mouth every 8 hours as needed for nausea 12/20/2018 at am Yes Cristobal Duque MD   oxybutynin ER (DITROPAN-XL) 5 MG 24 hr tablet Take 5 mg by mouth daily 12/20/2018 at am Yes Reported, Patient   timolol maleate (TIMOPTIC) 0.5 % ophthalmic solution INSTILL 1 DROP INTO RIGHT EYE 2 TIMES DAILY. 12/20/2018 at am Yes Reported, Patient   magnesium hydroxide (MILK OF MAGNESIA) 400 MG/5ML suspension Take 15 mLs by mouth At Bedtime  Unknown at Unknown time  Reported, Patient   traMADol (ULTRAM) 50 MG tablet TAKE ONE TABLET BY MOUTH EVERY 8 HOURS AS NEEDED FOR PAIN PRN at Not used yet  Reported, Patient         Medication history completed by: Cassius Conway, Pharmacy Intern

## 2018-12-21 NOTE — PLAN OF CARE
Observation goals PRIOR TO DISCHARGE     Comments: -diagnostic tests and consults completed and resulted -- No  -vital signs normal or at patient baseline - No  -tolerating oral intake to maintain hydration -- No  -safe disposition plan has been identified -- TBD  Nurse to notify provider when observation goals have been met and patient is ready for discharge.        Patient stating improvement to nausea today, but still decreased appetite and oral intake. Po zofran provided with morning meds and patient stated relief. Up with SBA d/t impaired vision. Notified BEVERLY about constipation, stool softener provided.

## 2018-12-21 NOTE — PROVIDER NOTIFICATION
Notified BEVERLY, patient states it's been several days since she's had a BM. Usually takes MoM at home. Meds ordered.

## 2018-12-21 NOTE — PROVIDER NOTIFICATION
Notified BEVERLY of vitals signs. Patient asymptomatic. No new orders.   With encouragement, patient tolerated a small amount of clear liquids. Drank broth, with few bites of jello and sips of apple juice.

## 2018-12-21 NOTE — PROGRESS NOTES
Observation Brochure and Video    observation status based on provider's order.  Observation brochure was given. Patient stated understanding. Questions answered.  Cornelius Temple

## 2018-12-21 NOTE — PROGRESS NOTES
Care Coordinator Progress Note    Admission Date/Time:  12/20/2018  Attending MD:  Nita Rojas, *    Data  Chart reviewed, discussed with interdisciplinary team.   Patient was admitted for: Nausea & vomiting.    Concerns with insurance coverage for discharge needs: None.  Current Living Situation: Patient lives alone.  Support System: Supportive and Involved  Services Involved: None at time of admission.   Transportation at Discharge: Family or friend will provide, patient reports her son will drive her home.   Transportation to Medical Appointments:Family provides transportation.  Barriers to Discharge: Medical stability.    Coordination of Care and Referrals: No new services at this time.        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 Writer introduced self and role of RNCC. Patient reports that she lives independently in senior housing in [place], [place]. Patient denies any services in the home, uses a cane when ambulating. Patient reports that her son will provide transportation at time of discharge. No RNCC needs anticipated.      Plan  Anticipated Discharge Date: 12/21?  Anticipated Discharge Plan:  Home    Soledad Johnson RNCC, BSN    [facility]    Medicine Group  [address] [place] [id number]    [email or web address]  [email or web address]    Office: [phone] Pager: [phone]  To contact weekend RNCC, dial [phone] and enter pager number [phone] at prompt. This pager can not be contacted by text page or outside line.

## 2018-12-21 NOTE — TELEPHONE ENCOUNTER
Pt in hospital following surgery on Tuesday    Dry heaves have improved still some nausea per son    Pt inpatient on 6D     Pt/son would like to see if able to see earlier post-op than next Friday    Reviewed with Dr. Lamas's RN-- ok to schedule next weds      Scheduled next weds-- son aware of date/time/location   Satya Macario RN 1:43 PM 12/21/18

## 2018-12-21 NOTE — PLAN OF CARE
Outpatient/Observation goals to be met before discharge home:     -diagnostic tests and consults completed and resulted: NO, BMP in AM    -vital signs normal or at patient baseline: YES    -tolerating oral intake to maintain hydration: YES    -safe disposition plan has been identified: PENDING

## 2018-12-21 NOTE — PROGRESS NOTES
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 She has been unable to advance her diet post-operatively due to nausea and vomiting. She is afebrile, no white count, no other concerning signs of infection at this time. She does not have abdominal pain, tenderness, or guarding. Plan for admission to ED Observation unit overnight for monitoring of nausea, vomiting and correction of hyponatremia.      1. Nausea/Vomiting  2. Hyponatremia -  Na 126 improved to 130 today. Reported nausea since operation. ED physician discussed with ophthalmology. Most likely due to post-anethesia nausea.  She is afebrile, no white count, no other concerning signs of infection at this time. She does not have abdominal pain, tenderness,  UA negative.  Patient received Zofran with improvement of her nausea. Tolerating minimal sips of water.   -Admit to ED Observation Unit  - Continue IVF @ 75/hr  -Vitals per routine  - scheduled Zofran q 4 hours, compazine prn  -Clear liquid diet to ADAT  -BMP in am     3.HTN:  PTA lisinopril. BP elevated during observation stay. Patient received IV hydralazine 5 mg and then 10 mg. Also received IV Metoprolol 5 mg x 1. Patients blood pressure improved to 160's/80's.   - Continue PTA Lisinopril  - Consider Metoprolol prn IV for elevated blood pressures.      Chronic Medical Problems  #Surgical evisceration of left eye: 12/19/18. Occlusive dressing in place. Patient has follow up with Opthalmology on 12/28 where bandage will be removed. Patient denies any eye pain. . PTA Keflex for post-operative prophylaxis  #Anxiety  #Hearing loss  #Memory loss  #Stress Incontinence:  continue PTA oxybutynin  #Seizure disorder: continue PTA Keppra  #Glaucoma     Discussed with Dr. Rojas.      FEN: clear liquids ADAT  Prophylaxis: none. Anticipate short stay and pt is ambulatory.  Code Status: Full    Disposition Plan   Expected discharge: tomorrow recommended to prior living arrangement once adequate pain management/ tolerating PO medications.  Entered: DINO Saucedo CNP 12/21/2018, 10:01 AM       The patient's care was discussed with the Attending Physician, Dr. Rojas, Bedside Nurse, Care Coordinator/ and Patient and family.    DINO Partida, NP  Emergency Department  [phone] Ex [id number]  __________________________    Interval History   No events overnight    Data reviewed today: I reviewed all medications, new labs and imaging results over the last 24 hours. I personally reviewed no images or EKG's today.    Physical Exam   Vital Signs: Temp: 98.4  F (36.9  C) Temp src: Oral BP: 185/86 Pulse: 74 Heart Rate: 78 Resp: 16 SpO2: 98 % O2 Device: None (Room air)    Weight: 137 lbs 0 oz  GENERAL: Alert and oriented x 3. NAD.   HEENT: Anicteric sclera. PRRL. Mucous membranes moist and without lesions. Occlusive dressing over left eye from surgery 12/19/18.   CV: RRR. S1, S2. No murmurs appreciated.   RESPIRATORY: Effort normal. Lungs CTAB with no wheezing, rales, rhonchi.   GI: Abdomen soft and non distended with normoactive bowel sounds present in all quadrants. No tenderness, no rebound, no guarding.   MUSCULOSKELETAL: No joint swelling or tenderness. Moves all extremities.   NEUROLOGICAL: No focal deficits. Strength 5/5 bilaterally in upper and lower extremities.   EXTREMITIES: No peripheral edema. Intact bilateral pedal pulses.   SKIN: No jaundice. No rashes.         Data   Recent Labs   Lab 12/21/18  0646 12/20/18  1148   WBC 7.5 10.3   HGB 11.7 13.1   MCV 92 92   * 172   * 126*   POTASSIUM 3.9 5.0   CHLORIDE 98 91*   CO2 21 26   BUN 16 20   CR 0.85  0.93   ANIONGAP 11 9   ADRIEL 8.0* 8.8   GLC 88 112*   ALBUMIN  --  4.1   PROTTOTAL  --  7.6   BILITOTAL  --  1.0   ALKPHOS  --  61   ALT  --  19   AST  --  44   LIPASE  --  142   TROPI  --  <0.015     No results found for this or any previous visit (from the past 24 hour(s)).  Medications     sodium chloride 1,000 mL (12/21/18 0309)       bimatoprost  1 drop Right Eye At Bedtime     brimonidine  1 drop Right Eye TID     cephALEXin  500 mg Oral BID     dorzolamide  1 drop Right Eye BID     hydrALAZINE  5 mg Intravenous Once     levETIRAcetam  500 mg Oral QPM     lisinopril  40 mg Oral Daily     Loteprednol Etabonate  1 drop Right Eye QPM     oxybutynin ER  5 mg Oral Daily     sodium chloride (PF)  3 mL Intracatheter Q8H     timolol maleate  1 drop Right Eye BID

## 2018-12-22 VITALS
DIASTOLIC BLOOD PRESSURE: 76 MMHG | HEIGHT: 59 IN | OXYGEN SATURATION: 98 % | TEMPERATURE: 98.1 F | SYSTOLIC BLOOD PRESSURE: 168 MMHG | RESPIRATION RATE: 18 BRPM | BODY MASS INDEX: 27.62 KG/M2 | HEART RATE: 79 BPM | WEIGHT: 137 LBS

## 2018-12-22 LAB
ANION GAP SERPL CALCULATED.3IONS-SCNC: 11 MMOL/L (ref 3–14)
BUN SERPL-MCNC: 14 MG/DL (ref 7–30)
CALCIUM SERPL-MCNC: 8.1 MG/DL (ref 8.5–10.1)
CHLORIDE SERPL-SCNC: 101 MMOL/L (ref 94–109)
CO2 SERPL-SCNC: 21 MMOL/L (ref 20–32)
CREAT SERPL-MCNC: 0.88 MG/DL (ref 0.52–1.04)
GFR SERPL CREATININE-BSD FRML MDRD: 58 ML/MIN/{1.73_M2}
GLUCOSE SERPL-MCNC: 93 MG/DL (ref 70–99)
POTASSIUM SERPL-SCNC: 3.6 MMOL/L (ref 3.4–5.3)
SODIUM SERPL-SCNC: 133 MMOL/L (ref 133–144)

## 2018-12-22 PROCEDURE — 36415 COLL VENOUS BLD VENIPUNCTURE: CPT | Performed by: NURSE PRACTITIONER

## 2018-12-22 PROCEDURE — 25000132 ZZH RX MED GY IP 250 OP 250 PS 637: Mod: GY | Performed by: PHYSICIAN ASSISTANT

## 2018-12-22 PROCEDURE — 25000131 ZZH RX MED GY IP 250 OP 636 PS 637: Mod: GY | Performed by: NURSE PRACTITIONER

## 2018-12-22 PROCEDURE — G0378 HOSPITAL OBSERVATION PER HR: HCPCS

## 2018-12-22 PROCEDURE — A9270 NON-COVERED ITEM OR SERVICE: HCPCS | Mod: GY | Performed by: PHYSICIAN ASSISTANT

## 2018-12-22 PROCEDURE — A9270 NON-COVERED ITEM OR SERVICE: HCPCS | Mod: GY | Performed by: NURSE PRACTITIONER

## 2018-12-22 PROCEDURE — 99217 ZZC OBSERVATION CARE DISCHARGE: CPT | Mod: Z6 | Performed by: EMERGENCY MEDICINE

## 2018-12-22 PROCEDURE — 25000132 ZZH RX MED GY IP 250 OP 250 PS 637: Mod: GY | Performed by: NURSE PRACTITIONER

## 2018-12-22 PROCEDURE — 80048 BASIC METABOLIC PNL TOTAL CA: CPT | Performed by: NURSE PRACTITIONER

## 2018-12-22 RX ORDER — METOPROLOL TARTRATE 25 MG/1
25 TABLET, FILM COATED ORAL 2 TIMES DAILY
Qty: 28 TABLET | Refills: 0 | Status: SHIPPED | OUTPATIENT
Start: 2018-12-22 | End: 2019-02-11

## 2018-12-22 RX ORDER — METOPROLOL TARTRATE 25 MG/1
25 TABLET, FILM COATED ORAL 2 TIMES DAILY
Status: DISCONTINUED | OUTPATIENT
Start: 2018-12-22 | End: 2018-12-22 | Stop reason: HOSPADM

## 2018-12-22 RX ORDER — ONDANSETRON 4 MG/1
4 TABLET, ORALLY DISINTEGRATING ORAL EVERY 6 HOURS PRN
Status: DISCONTINUED | OUTPATIENT
Start: 2018-12-22 | End: 2018-12-22 | Stop reason: HOSPADM

## 2018-12-22 RX ORDER — ONDANSETRON 2 MG/ML
4 INJECTION INTRAMUSCULAR; INTRAVENOUS EVERY 6 HOURS PRN
Status: DISCONTINUED | OUTPATIENT
Start: 2018-12-22 | End: 2018-12-22 | Stop reason: HOSPADM

## 2018-12-22 RX ADMIN — ONDANSETRON 4 MG: 4 TABLET, ORALLY DISINTEGRATING ORAL at 03:32

## 2018-12-22 RX ADMIN — BRIMONIDINE TARTRATE 1 DROP: 2 SOLUTION/ DROPS OPHTHALMIC at 09:28

## 2018-12-22 RX ADMIN — METOPROLOL TARTRATE 25 MG: 25 TABLET ORAL at 03:32

## 2018-12-22 RX ADMIN — OXYBUTYNIN CHLORIDE 5 MG: 5 TABLET, EXTENDED RELEASE ORAL at 09:26

## 2018-12-22 RX ADMIN — DORZOLAMIDE HCL 1 DROP: 20 SOLUTION/ DROPS OPHTHALMIC at 09:28

## 2018-12-22 RX ADMIN — TIMOLOL MALEATE 1 DROP: 5 SOLUTION/ DROPS OPHTHALMIC at 09:29

## 2018-12-22 RX ADMIN — ONDANSETRON 4 MG: 4 TABLET, ORALLY DISINTEGRATING ORAL at 09:25

## 2018-12-22 RX ADMIN — CEPHALEXIN 500 MG: 500 CAPSULE ORAL at 09:26

## 2018-12-22 NOTE — PROGRESS NOTES
"Emergency Medicine Observation Attending note    The patient was independently seen and examined by me. The chart, vital signs, and labs were reviewed. The patient's findings were discussed with the BEVERLY on the observation unit, and I agree with the findings of the note and the plan.    89 yo female, admitted to the OBS Unit after presenting to the ER with N/V. She had had left globe evisceration the day prior to presentation, secondary to globe rupture from phthisis. She reported that she had been NPO since the evening before the procedure. When she tried to take po after the procedure, she couldn't tolerate it, and had N/V. She does report that she had nausea for a few days prior to the procedure. No associated HA, cough, SOB, CP, abd pain, eye pain, fevers, though she did complain of mild generalized weakness. ED w/u revealed hyponatremia with a sodium of 126. She was admitted for sx control and an Optho consult. She did have episodes of high BPs yesterday, despite her home dose of lisinopril, which were treated with hydralazine and metoprolol. This morning she report that she's feeling better - no ongoing nausea and no pain.    /85 (BP Location: Right arm)   Pulse 79   Temp 98.1  F (36.7  C) (Oral)   Resp 18   Ht 1.499 m (4' 11\")   Wt 62.1 kg (137 lb)   SpO2 98%   BMI 27.67 kg/m      Exam:  General: awake, alert, NAD  HEENT: NC/AT, Patch over left eye  Neck: supple  Lungs: CTA-B  Heart: RRR, no M/R/G  Abd: soft, ND/NT  Ext: non-tender, no edema      Assessment/plan:  1. N/V - ? Anesthesia related? Seems resolved. Encourage gentle advancement of diety  2. Hyponatremia - improved with gentle hydration. Will need outpt f/u.  3. High BPs - po metoprolol added. Pt states that it's been quite some time since she had her BP checked prior to this hospitalization, and doesn't know what it normally runs. Given this, I'd be hesitant to be too aggressive too quickly, and we will shoot for BPs of 170s systolic " prior to discharge, and recommend close f/u.   4. S/p left eye evisceration - will seek input from optho today.  5. Dispo - likely discharge today, pending tolerance of oral hydration.

## 2018-12-22 NOTE — PLAN OF CARE
Observation goals   PRIOR TO DISCHARGE  Diagnostic tests and consults completed and resulted:      1. Vital signs normal or at patient baseline: not met, pt hypertensive (170s-180s/70s-80s); provider aware, metoprolol given w/ lowered HR but only small decrease in BP, provider reviewing chart  2. Tolerating oral intake to maintain hydration: partially met, pt drank 360mL water in addition to fluids charted earlier, states she might be ready for more solid foods in AM  3. Safe disposition plan has been identified: not met, TBD     Nurse to notify provider when observation goals have been met and patient is ready for discharge.

## 2018-12-22 NOTE — PLAN OF CARE
Pt with HTN after surgery now resolved. Discharged into care of son today leaving floor at 1402. VSS on RA. A&Ox4. L eye incision covered, CDI. Voiding Spont. Up with cane at baseline.

## 2018-12-22 NOTE — PROGRESS NOTES
Care Coordinator - Discharge Planning    Admission Date/Time:  12/20/2018  Attending MD:  Marlen Hall NP     Data  Date of initial CC assessment:  12/22/18  Chart reviewed, discussed with interdisciplinary team.   Patient was admitted for:   1. Nausea & vomiting    2. Eye evisceration, traumatic, left, subsequent encounter         Assessment   Full assessment completed in previous note. Pt lives alone in [place] but is staying locally with her son. ALESSANDRO Gee noted pt's BP to be high and has ordered a blood pressure medication to lower it. Marlen would like to discharge pt to son's today with home care nurse to check on pt. I met with pt today and she is agreeable to home care RN visit. She is not currently open to home care and would like to use [facility]. Pt's son is providing transportation home. Pt has follow up appt with her eye clinic on 12/26/18.     Coordination of Care and Referrals: I have completed home care orders and referral to HC for RN only. RN will see her at pt's son's home in [place]. I have entered his address in the orders.       Plan  Anticipated Discharge Date:  12/22/18  Anticipated Discharge Plan:  Home to son's house with assist and home care visit.     CTS Handoff completed:  YES    Gino Eason RN, BSN  ICU Care Coordinator  Pager: [phone]  Phone:  [phone]

## 2018-12-22 NOTE — PLAN OF CARE
Observation Goals:  - Vital signs normal or at patient baseline: Yes; BP below 180.   - Tolerating oral intake to maintain hydration: Yes; drinking sufficiantly, denies nausea, needs encouragement to eat  - Safe disposition plan has been identified: Yes; per care coordinator note, pt will go home with son for a few days.     Provider notified of goals being met.

## 2018-12-22 NOTE — PLAN OF CARE
Observation goals   PRIOR TO DISCHARGE  Diagnostic tests and consults completed and resulted:      1. Vital signs normal or at patient baseline: not met, pt hypertensive (170s-180s/70s-80s); provider aware, PO metoprolol given w/o appreciable change, provider monitoring  2. Tolerating oral intake to maintain hydration: met, pt drinking water well, states she should be ready for more solid foods in AM  3. Safe disposition plan has been identified: not met, TBD     Nurse to notify provider when observation goals have been met and patient is ready for discharge.    A&Ox4, afebrile, HTN, MDs aware, AOVSS on RA. Voiding well, no BM this shift. PIV infusing MIVF @ 75mL/hr. Up w/ SBA, bed alarm on for safety.

## 2018-12-22 NOTE — PLAN OF CARE
Observation Goals:  - Vital signs normal or at patient baseline: No, blood pressure remains above 180 but trending down.  - Tolerating oral intake to maintain hydration: Yes; drinking sufficiantly, denies nausea, needs encouragement to eat  - Safe disposition plan has been identified: No plan as of yet.

## 2018-12-22 NOTE — DISCHARGE SUMMARY
"ED Observation Discharge Summary    Amna Herrera   MRN# [id number]  Age: 89 year old   YOB: 1928            Date of Admission:  12/20/2018    Date of Discharge:  12/22/2018  Admitting Physician:  Marcus Ratliff MD  Discharge Physician: Dr. Keith MD  NP/PA: Marlen Hall CNP        DISCHARGE DIAGNOSIS:     Nausea & vomiting    * No resolved hospital problems. *      INTERVAL HISTORY: B/P improved. Tolerating PO intake. Feels ready to discharge to home.     PHYSICAL EXAM:   Blood pressure 168/76, pulse 79, temperature 98.1  F (36.7  C), temperature source Oral, resp. rate 18, height 1.499 m (4' 11\"), weight 62.1 kg (137 lb), SpO2 98 %.     GENERAL APPEARENCE: Pleasant, generally appears well, A/O x4. NAD.  SKIN: Clean, dry, and intact without visible lesions, rash, jaundice, cyanosis, erythema, ecchymoses to exposed areas. No hair or nail changes.  HEENT/NECK: Occlusive dressing to left eye s/p surgery on 12/19/2018.   CARDIOVASCULAR: S1, S2 RRR. No murmurs, rubs, or gallops.   RESPIRATORY: Respiratory effort WNL. CTA  bilaterally without crackles/rales/wheeze   GI: Active BS in all 4 quadrants. Abdomen soft and non-tender. No masses or hepatosplenomegaly.  : Deferred  MUSCULOSKELETAL:  Extremities normal, no gross deformities noted, non-tender to palpation.   PV: 2+ bilateral radial and pedal pulses. No edema noted.   NEURO: CN II-XII grossly intact. Speech normal. Appropriate throughout interview.   HEME/LYMPH: No visible bleeding. No palpable submandibular, submental, pre or postauricular, occipital, cervical, or supraclavicular lymph nodes  PSYCHIATRIC: Mentation and affect appear normal  VASCULAR ACCESS: CDI without erythema or discharge. Non-tender.    PROCEDURES AND IMAGING:   Results for orders placed or performed during the hospital encounter of 12/20/18 (from the past 24 hour(s))   EKG 12-lead, tracing only   Result Value Ref Range    Interpretation ECG Click View Image link " to view waveform and result    Basic metabolic panel   Result Value Ref Range    Sodium 133 133 - 144 mmol/L    Potassium 3.6 3.4 - 5.3 mmol/L    Chloride 101 94 - 109 mmol/L    Carbon Dioxide 21 20 - 32 mmol/L    Anion Gap 11 3 - 14 mmol/L    Glucose 93 70 - 99 mg/dL    Urea Nitrogen 14 7 - 30 mg/dL    Creatinine 0.88 0.52 - 1.04 mg/dL    GFR Estimate 58 (L) >60 mL/min/[1.73_m2]    GFR Estimate If Black 67 >60 mL/min/[1.73_m2]    Calcium 8.1 (L) 8.5 - 10.1 mg/dL     DISCHARGE MEDICATIONS:   Current Discharge Medication List      START taking these medications    Details   metoprolol tartrate (LOPRESSOR) 25 MG tablet Take 1 tablet (25 mg) by mouth 2 times daily  Qty: 28 tablet, Refills: 0    Associated Diagnoses: Essential hypertension         CONTINUE these medications which have NOT CHANGED    Details   aspirin 81 MG EC tablet Take 81 mg by mouth daily       brimonidine (ALPHAGAN) 0.2 % ophthalmic solution INSTILL ONE DROP IN RIGHT EYE THREE TIMES DAILY  Refills: 6      Carboxymeth-Glycerin-Polysorb (REFRESH OPTIVE ADVANCED) 0.5-1-0.5 % SOLN Place 1 drop into the right eye daily as needed      cephALEXin (KEFLEX) 500 MG capsule Take 1 capsule (500 mg) by mouth 2 times daily for 10 days  Qty: 20 capsule, Refills: 0    Associated Diagnoses: Postoperative eye state      cholecalciferol (VITAMIN D) 400 UNIT TABS Take 400 Units by mouth daily.      dexamethasone (DECADRON) 0.1 % ophthalmic suspension Place 1 drop into the right eye 3 times daily       dorzolamide (TRUSOPT) 2 % ophthalmic solution INSTILL 1 DROP INTO RIGHT EYE 2 TIMES DAILY.  Refills: 6      HYDROcodone-acetaminophen (NORCO) 5-325 MG tablet Take 1-2 tablets by mouth every 4 hours as needed for moderate to severe pain  Qty: 15 tablet, Refills: 0    Associated Diagnoses: Postoperative eye state      levETIRAcetam (KEPPRA) 500 MG tablet Take 500 mg by mouth daily  Refills: 3      lisinopril (PRINIVIL,ZESTRIL) 40 MG tablet Take 40 mg by mouth daily.     "  LOTEMAX 0.5 % GEL INSTILL ONE DROP IN RIGHT EYE ONCE DAILY.  Refills: 6      LUMIGAN 0.01 % SOLN ophthalmic solution INSTILL 1 DROP INTO RIGHT EYE ONCE DAILY IN THE EVENING.  Refills: 6      Multiple Vitamin (MULTIVITAMIN) per tablet Take 1 tablet by mouth daily.      ondansetron (ZOFRAN) 4 MG tablet Take 1 tablet (4 mg) by mouth every 8 hours as needed for nausea  Qty: 9 tablet, Refills: 0    Associated Diagnoses: Nausea      oxybutynin ER (DITROPAN-XL) 5 MG 24 hr tablet Take 5 mg by mouth daily  Refills: 3      timolol maleate (TIMOPTIC) 0.5 % ophthalmic solution INSTILL 1 DROP INTO RIGHT EYE 2 TIMES DAILY.  Refills: 6      magnesium hydroxide (MILK OF MAGNESIA) 400 MG/5ML suspension Take 15 mLs by mouth At Bedtime       traMADol (ULTRAM) 50 MG tablet TAKE ONE TABLET BY MOUTH EVERY 8 HOURS AS NEEDED FOR PAIN  Refills: 0               CONSULTATIONS:   Consultation during this admission received from:  Discussed with Ophthalmology     BRIEF HISTORY OF PRESENT ILLNESS:   (Adopted from admission H&P).    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 Patient had a left globe evisceration performed yesterday. She has been NPO since Tuesday night, 12/18/18, the day prior to her surgery. However, complains of ongoing nausea and loss of appetite prior to this. Patient attempted oral fluid intake following the procedure, but has been unable to tolerate anything. She denies any headache, cough, shortness of breath, chest pain, or abdominal pain. She presents with her son, Tulio.     In the ED the patient's vital signs were stable, she was afebrile.  Labs: Na 126, K+ 5.0, Cr 0.93, BUN 20. Lipase 142. WBC 10.3, Hgb 13.1, hematocrit 39.1.  EKG shows sinus rhythm with first degree AV Block.  She was given bolus IV fluids x2 and zofran X 2.  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 She has been unable to advance her diet post-operatively due to nausea and vomiting. She is afebrile, no white count, no other concerning signs of infection at this time. She does not have abdominal pain, tenderness, or guarding. Plan for admission to ED Observation unit overnight for monitoring of nausea, vomiting and correction of hyponatremia.      1. Nausea/Vomiting, Hyponatremia: Resolved Na 126 improved to 133 today. Reported nausea since operation. ED physician discussed with ophthalmology. Most likely due to post-anethesia nausea.  She is afebrile, no white count, no other concerning signs of infection at this time. She does not have abdominal pain, tenderness,  UA negative.  Patient received Zofran with improvement of her nausea. Tolerating minimal sips of water initially and now tolerated regular diet today. Feels ready to discharge to home. Resume home zofran. She as instructed to return to the ED with fever, uncontrolled nausea, vomiting, unrelieved pain, bleeding not relieved with pressure, dizziness, chest pain, shortness of breath, loss of consciousness, and any new or concerning symptoms.          2. HTN:  Was on lisinopril PTA. BP elevated during observation stay (195-153/73-92). Patient received IV hydralazine 5 mg and then 10 mg. Also received IV Metoprolol 5 mg x 1. She was started on Metoprolol 25 mg's BID last night. This am she was initially 195/92, improved to 168/76 after AM lisinopril and metoprolol 25 mg's PO. Will start metoprolol 25 mg's BID. Will have home care RN follow-up with the patient as she does not live in the [facility] but will be staying locally.   - Continue PTA " Lisinopril, start metoprolol and follow-up with PCP   - Consider Metoprolol prn IV for elevated blood pressures.      Chronic Medical Problems  #Surgical evisceration of left eye: 12/19/18. Occlusive dressing in place. Patient has follow up with Opthalmology on 12/28 where bandage will be removed. Discussed with ophthalmology. Did request at formal consult. However, at this time the patient is stable for discharge. Team wouldn't be able to round until late in the day and no further recommendations as she needs to leave dressing in placed. Cleared for discharge from ophthalmology perspective. Patient denies any eye pain. PTA Keflex for post-operative prophylaxis  #Anxiety  #Hearing loss  #Memory loss  #Stress Incontinence: continue PTA oxybutynin  #Seizure disorder: continue PTA Keppra  #Glaucoma            DISCHARGE DISPOSITION:   Discharged to home. The patient was discharged in a stable condition and agreed to discharge plan.    DISCHARGE INSTRUCTIONS AND FOLLOW-UP:  Discharge Procedure Orders   Home care nursing referral   Referral Type: Home Health Therapies & Aides   Number of Visits Requested: 1     Reason for your hospital stay   Order Comments: You were admitted to the hospital with nausea and vomiting after surgery. Your blood pressure was noted to be high. We started you on a new blood pressure medication for this.     Adult [facility] Follow-up and recommended labs and tests   Order Comments: Follow up with primary care provider, Rey Cherry, within 7 days to evaluate medication change and for hospital follow- up.  The following labs/tests are recommended: BMP and CBC.      Opthamology as schedule prior to admission     Blood pressure recheck tomorrow     Appointments on [place] and/or [facility] (with [facility] or [facility] provider or service). Call [phone] if you haven't heard regarding these appointments within 7 days of discharge.     Activity   Order Comments: Your activity upon discharge:  activity as tolerated and no driving for today, activity as instructed by opthamology.     Order Specific Question Answer Comments   Is discharge order? Yes      Wound care and dressings   Order Comments: Instructions to care for your wound at home: as directed by opthamology .     Monitor and record   Order Comments: blood pressure daily, notify clinic is > 180 or < 100    Hold your blood pressure medication and check your blood pressure if you were to feel lightheaded/dizzy     Diet   Order Comments: Follow this diet upon discharge: Orders Placed This Encounter      Advance Diet as Tolerated: Regular Diet Adult  Be sure to drink plenty of non-caffeinated beverages.     Order Specific Question Answer Comments   Is discharge order? Yes       Attestation:   I have reviewed today's vital signs, notes, medications, labs and imaging.      DINO Groves, CNP  Emergency Department Observation Unit

## 2018-12-22 NOTE — PLAN OF CARE
Observation goals   PRIOR TO DISCHARGE  Diagnostic tests and consults completed and resulted:     1. Vital signs normal or at patient baseline: not met, pt hypertensive (170s-180s/70s-80s); provider notified  2. Tolerating oral intake to maintain hydration: partially met, pt drank 240 mL of broth, 120 mL apple juice, and ate a few bites of jello  3. Safe disposition plan has been identified: not met, TBD    Nurse to notify provider when observation goals have been met and patient is ready for discharge.

## 2018-12-24 LAB — INTERPRETATION ECG - MUSE: NORMAL

## 2018-12-26 ENCOUNTER — OFFICE VISIT (OUTPATIENT)
Dept: OPHTHALMOLOGY | Facility: CLINIC | Age: 83
End: 2018-12-26
Payer: COMMERCIAL

## 2018-12-26 DIAGNOSIS — Q11.1 ANOPHTHALMOS OF LEFT EYE: Primary | ICD-10-CM

## 2018-12-26 RX ORDER — ERYTHROMYCIN 5 MG/G
0.5 OINTMENT OPHTHALMIC 2 TIMES DAILY
Qty: 3.5 G | Refills: 0 | Status: SHIPPED | OUTPATIENT
Start: 2018-12-26 | End: 2019-02-11

## 2018-12-26 ASSESSMENT — VISUAL ACUITY
OS_SC: XXX
METHOD: SNELLEN - LINEAR
OD_CC: 20/70

## 2018-12-26 ASSESSMENT — TONOMETRY
IOP_METHOD: ICARE
OS_IOP_MMHG: XXX
OD_IOP_MMHG: 11

## 2018-12-26 NOTE — PROGRESS NOTES
Assessment    Amna Herrera is a 89 year old female with the following diagnoses:   1. Anophthalmos of left eye       S/p Evisceration left eye 12/19/18  - doing well, no pain  - conformer in place  - no exposure, healing well    PLAN:  Erythromycin to left socket BID until tube finished    F/u 6-8 weeks w/  appt same day      David Lamas MD, BRAD  Oculofacial Plastics and Orbit Surgery Fellow    Attestation:  Complete documentation of historical and exam elements from today's encounter can be found in the full encounter summary report (not reduplicated in this progress note).  I personally obtained the chief complaint(s) and history of present illness.  I confirmed and edited as necessary the review of systems, past medical/surgical history, family history, social history, and examination findings as documented by others; and I examined the patient myself.   I formulated and edited as necessary the assessment and plan and discussed the findings and management plan with the patient and family.  -David Lamas MD, BRAD

## 2019-01-29 ENCOUNTER — MEDICAL CORRESPONDENCE (OUTPATIENT)
Dept: HEALTH INFORMATION MANAGEMENT | Facility: CLINIC | Age: 84
End: 2019-01-29

## 2019-02-11 ENCOUNTER — OFFICE VISIT (OUTPATIENT)
Dept: OPHTHALMOLOGY | Facility: CLINIC | Age: 84
End: 2019-02-11
Payer: COMMERCIAL

## 2019-02-11 DIAGNOSIS — Q11.1 ANOPHTHALMOS OF LEFT EYE: Primary | ICD-10-CM

## 2019-02-11 ASSESSMENT — VISUAL ACUITY
OD_CC: 20/80
METHOD: SNELLEN - LINEAR
OS_CC: XX
OD_CC+: +1

## 2019-02-11 ASSESSMENT — EXTERNAL EXAM - LEFT EYE: OS_EXAM: NORMAL

## 2019-02-11 ASSESSMENT — TONOMETRY
OS_IOP_MMHG: XX
IOP_METHOD: ICARE
OD_IOP_MMHG: 08

## 2019-02-11 ASSESSMENT — SLIT LAMP EXAM - LIDS: COMMENTS: NORMAL

## 2019-02-11 NOTE — PROGRESS NOTES
Chief Complaints and History of Present Illnesses   Patient presents with     Post Op (Ophthalmology) Left Eye     POW#7 s/p Evisceration of left with placement of an 18 mm silcone implant     Doing well   Had a rough time after surgery with eating. Moved to an assisted living.         Assessment & Plan     Amna Herrera is a 89 year old female with the following diagnoses:   1. Anophthalmos of left eye     -doing well    PLAN:  Getting prosthesis today.   Return to clinic 6 months            Attending Physician Attestation:  I have seen and examined this patient.  I have confirmed and edited as necessary the chief complaint(s), history of present illness, review of systems, relevant history, and examination findings as documented by others.  I have personally reviewed the relevant tests, images, and reports as documented above.  I have confirmed and edited as necessary the assessment and plan and agree with this note.    - Cristobal Duque MD 9:09 AM 2/11/2019

## 2019-02-11 NOTE — NURSING NOTE
Chief Complaints and History of Present Illnesses   Patient presents with     Post Op (Ophthalmology) Left Eye     POW#7 s/p Evisceration of left with placement of an 18 mm silcone implant     Chief Complaint(s) and History of Present Illness(es)     Post Op (Ophthalmology) Left Eye     Laterality: both eyes    Associated symptoms: tearing.  Negative for eye pain    Comments: POW#7 s/p Evisceration of left with placement of an 18 mm silcone implant              Comments     Patient notes that the LE is doing very well, a lot of tearing through out the day, mattered shut in the morning.     Sadie Vieira February 11, 2019 8:53 AM

## 2022-02-13 NOTE — LETTER
Transition Communication Hand-off for Care Transitions to Next Level of Care Provider    Name: Amna Herrera  : 1928  MRN #: [id number]  Primary Care Provider: Rey Cherry     Primary Clinic: [place] [id number] [facility] [address]     Reason for Hospitalization:  Nausea & vomiting [R11.2]  Admit Date/Time: 2018 11:23 AM  Discharge Date: 18  Payor Source: Payor: BCBS / Plan: [employer] PLATINUM BLUE / Product Type: PPO /              Reason for Communication Hand-off Referral: Fragility    Discharge Plan: Home with son and home care RN        Concern for non-adherence with plan of care:   NO  Discharge Needs Assessment: will need primary PCP once pt is back to her home in [place]      Follow-up specialty is recommended: Yes    Follow-up plan:    Future Appointments   Date Time Provider Department Center   2018  8:30 AM David Lamas MD [facility]       Any outstanding tests or procedures:        Referrals     Future Labs/Procedures    Home care nursing referral     Comments:    [facility]  PH: [phone]    Pt will be discharged to her son's home and staying until she has her  appt with eye doctor.     Son: Figueroa Merrill  Address: [address] [place], [place] [id number]  PH: [phone]    RN skilled nursing visit. RN to assess vital signs and weight, respiratory and cardiac status, pain level and activity tolerance, incision for signs/symptoms of infection, hydration, nutrition and bowel status and home safety.    Your provider has ordered home care nursing services. If you have not been contacted within 2 days of your discharge please call the inpatient department phone number at [phone] .              Yaneli Eason RNCC    AVS/Discharge Summary is the source of truth; this is a helpful guide for improved communication of patient story           rolling walker

## (undated) DEVICE — SUCTION MANIFOLD NEPTUNE 2 SYS 1 PORT 702-025-000

## (undated) DEVICE — SYR 03ML LL W/O NDL 309657

## (undated) DEVICE — ESU CORD BIPOLAR GREEN 10-4000

## (undated) DEVICE — BLADE KNIFE SURG 15 371115

## (undated) DEVICE — EYE SHIELD PLASTIC

## (undated) DEVICE — EYE CONFORMER MED S6.2231U

## (undated) DEVICE — TAPE ELASTIKON 2" LATEX

## (undated) DEVICE — SOL WATER IRRIG 500ML BOTTLE 2F7113

## (undated) DEVICE — ESU PENCIL W/COATED BLADE E2450H

## (undated) DEVICE — GLOVE PROTEXIS MICRO 7.5  2D73PM75

## (undated) DEVICE — ADH LIQUID MASTISOL TOPICAL VIAL 2-3ML 0523-48

## (undated) DEVICE — EYE KNIFE SLIT XSTAR VISITEC 3.0MM 45DEG 373730

## (undated) DEVICE — ESU NDL COLORADO MICRO 3CM STR N103A

## (undated) DEVICE — PACK MINOR EYE CUSTOM ASC

## (undated) DEVICE — DRSG GAUZE 4X4" TRAY 6939

## (undated) DEVICE — SU ETHILON 5-0 P-3 18" BLACK 698G

## (undated) DEVICE — SU VICRYL 6-0 P-1 18" UND J489G

## (undated) DEVICE — SPECIMEN CONTAINER W/10% BUFFERED FORMALIN 120ML 591201

## (undated) DEVICE — TUBING SUCTION 12"X1/4" N612

## (undated) DEVICE — LINEN TOWEL PACK X5 5464

## (undated) DEVICE — ESU GROUND PAD ADULT W/CORD E7507

## (undated) DEVICE — EYE DRSG PAD OVAL

## (undated) RX ORDER — PROPOFOL 10 MG/ML
INJECTION, EMULSION INTRAVENOUS
Status: DISPENSED
Start: 2018-12-19

## (undated) RX ORDER — EPHEDRINE SULFATE 50 MG/ML
INJECTION, SOLUTION INTRAMUSCULAR; INTRAVENOUS; SUBCUTANEOUS
Status: DISPENSED
Start: 2018-12-19

## (undated) RX ORDER — LIDOCAINE HYDROCHLORIDE 20 MG/ML
INJECTION, SOLUTION EPIDURAL; INFILTRATION; INTRACAUDAL; PERINEURAL
Status: DISPENSED
Start: 2018-12-19

## (undated) RX ORDER — DEXAMETHASONE SODIUM PHOSPHATE 4 MG/ML
INJECTION, SOLUTION INTRA-ARTICULAR; INTRALESIONAL; INTRAMUSCULAR; INTRAVENOUS; SOFT TISSUE
Status: DISPENSED
Start: 2018-12-19

## (undated) RX ORDER — GLYCOPYRROLATE 0.2 MG/ML
INJECTION INTRAMUSCULAR; INTRAVENOUS
Status: DISPENSED
Start: 2018-12-19

## (undated) RX ORDER — LIDOCAINE HYDROCHLORIDE 10 MG/ML
INJECTION, SOLUTION EPIDURAL; INFILTRATION; INTRACAUDAL; PERINEURAL
Status: DISPENSED
Start: 2018-12-19

## (undated) RX ORDER — PHENYLEPHRINE HCL IN 0.9% NACL 1 MG/10 ML
SYRINGE (ML) INTRAVENOUS
Status: DISPENSED
Start: 2018-12-19

## (undated) RX ORDER — GENTAMICIN 40 MG/ML
INJECTION, SOLUTION INTRAMUSCULAR; INTRAVENOUS
Status: DISPENSED
Start: 2018-12-19

## (undated) RX ORDER — ACETAMINOPHEN 325 MG/1
TABLET ORAL
Status: DISPENSED
Start: 2018-12-19

## (undated) RX ORDER — ONDANSETRON 2 MG/ML
INJECTION INTRAMUSCULAR; INTRAVENOUS
Status: DISPENSED
Start: 2018-12-19

## (undated) RX ORDER — FENTANYL CITRATE 50 UG/ML
INJECTION, SOLUTION INTRAMUSCULAR; INTRAVENOUS
Status: DISPENSED
Start: 2018-12-19